# Patient Record
Sex: FEMALE | Race: WHITE | NOT HISPANIC OR LATINO | Employment: FULL TIME | URBAN - METROPOLITAN AREA
[De-identification: names, ages, dates, MRNs, and addresses within clinical notes are randomized per-mention and may not be internally consistent; named-entity substitution may affect disease eponyms.]

---

## 2019-02-13 ENCOUNTER — OFFICE VISIT (OUTPATIENT)
Dept: URGENT CARE | Facility: CLINIC | Age: 24
End: 2019-02-13
Payer: COMMERCIAL

## 2019-02-13 VITALS
HEART RATE: 80 BPM | WEIGHT: 270 LBS | RESPIRATION RATE: 18 BRPM | OXYGEN SATURATION: 100 % | HEIGHT: 66 IN | SYSTOLIC BLOOD PRESSURE: 115 MMHG | TEMPERATURE: 98 F | DIASTOLIC BLOOD PRESSURE: 66 MMHG | BODY MASS INDEX: 43.39 KG/M2

## 2019-02-13 DIAGNOSIS — J01.00 ACUTE NON-RECURRENT MAXILLARY SINUSITIS: Primary | ICD-10-CM

## 2019-02-13 DIAGNOSIS — B30.9 ACUTE VIRAL CONJUNCTIVITIS OF RIGHT EYE: ICD-10-CM

## 2019-02-13 PROCEDURE — G0382 LEV 3 HOSP TYPE B ED VISIT: HCPCS | Performed by: EMERGENCY MEDICINE

## 2019-02-13 PROCEDURE — 99283 EMERGENCY DEPT VISIT LOW MDM: CPT | Performed by: EMERGENCY MEDICINE

## 2019-02-13 RX ORDER — LISDEXAMFETAMINE DIMESYLATE 40 MG/1
CAPSULE ORAL
COMMUNITY
Start: 2019-01-08 | End: 2021-02-05

## 2019-02-13 RX ORDER — AMOXICILLIN AND CLAVULANATE POTASSIUM 875; 125 MG/1; MG/1
1 TABLET, FILM COATED ORAL EVERY 12 HOURS SCHEDULED
Qty: 20 TABLET | Refills: 0 | Status: SHIPPED | OUTPATIENT
Start: 2019-02-13 | End: 2019-02-23

## 2019-02-13 RX ORDER — POLYMYXIN B SULFATE AND TRIMETHOPRIM 1; 10000 MG/ML; [USP'U]/ML
1 SOLUTION OPHTHALMIC EVERY 6 HOURS
Qty: 10 ML | Refills: 0 | Status: SHIPPED | OUTPATIENT
Start: 2019-02-13 | End: 2019-02-20

## 2019-02-13 RX ORDER — PREDNISONE 10 MG/1
TABLET ORAL
Qty: 27 TABLET | Refills: 0 | Status: SHIPPED | OUTPATIENT
Start: 2019-02-13 | End: 2019-11-17

## 2019-02-13 NOTE — PROGRESS NOTES
[facility] [facility]        NAME: Coby Campa is a 26 y o  female  : 1995    MRN: [id number]  DATE: 2019  TIME: 4:41 PM    Assessment and Plan   Acute non-recurrent maxillary sinusitis [J01 00]  1  Acute non-recurrent maxillary sinusitis  amoxicillin-clavulanate (AUGMENTIN) 875-125 mg per tablet   2  Acute viral conjunctivitis of right eye  predniSONE 10 mg tablet    polymyxin b-trimethoprim (POLYTRIM) ophthalmic solution         Patient Instructions     Patient Instructions     Take an expectorant - guaifenesin should be the only ingredient - during the day, and the cough suppressant (ex  Robitussin DM or Tessalon) if needed at night only  Take Zinc 12 5 to 15 mg every 2 - 3 hrs while awake for the next few days  You may take Cold Steven (13 3 mg of Zinc) or split a 25 mg Zinc tablet or lozenge in two or a 50 mg into four to get the proper dose  The total daily dose of Zinc should exceed 75 mg per day  If you are diabetic, you should also adhere strictly to your diet and monitor your blood sugar closely while on the steroids as discussed  Sinusitis, Ambulatory Care   GENERAL INFORMATION:   Sinusitis  is inflammation or infection of your sinuses  It is most often caused by a virus  Acute sinusitis may last up to 12 weeks  Chronic sinusitis lasts longer than 12 weeks  Recurrent sinusitis is when you have 3 or more episodes of sinusitis in 1 year    Common symptoms include the following:   · Fever    · Pain, pressure, redness, or swelling around the forehead, cheeks, or eyes    · Thick yellow or green discharge from your nose    · Tenderness when you touch your face over your sinuses    · Dry cough that happens mostly at night or when you lie down    · Headache and face pain that is worse when you lean forward    · Teeth pain or pain when you chew  Seek immediate care for the following symptoms:   · Vision changes such as double vision    · Confusion or trouble thinking clearly    · Headache and stiff neck    · Trouble breathing  Treatment for sinusitis  may include medicines to relieve nasal and sinus congestion or to decrease pain and fever  Ask your healthcare provider which medicines you should take and how much is safe  Manage sinusitis:   · Drink liquids as directed  Ask your healthcare provider how much liquid to drink each day and which liquids are best for you  Liquids will help loosen and drain the mucus in your sinuses  · Breathe in steam   Heat a bowl of water until you see steam  Lean over the bowl and make a tent over your head with a large towel  Breathe deeply for about 20 minutes  Be careful not to get too close to the steam or burn yourself  Do this 3 times a day  You can also breathe deeply when you take a hot shower  · Rinse your sinuses  Use a sinus rinse device to rinse your nasal passages with a saline (salt water) solution  This will help thin the mucus in your nose and rinse away pollen and dirt  It will also help reduce swelling so you can breathe normally  Ask how often to do this  · Use heat on your sinuses  to decrease pain  Apply heat for 15 to 20 minutes every hour for as many days as directed  · Sleep with your head elevated  Place an extra pillow under your head before you go to sleep to help your sinuses drain  · Do not smoke and avoid secondhand smoke  If you smoke, it is never too late to quit  Ask for information about how to stop smoking if you need help  Prevent the spread of germs that cause sinusitis:  Wash your hands often with soap and water  Wash your hands after you use the bathroom, change a child's diaper, or sneeze  Wash your hands before you prepare or eat food  Follow up with your healthcare provider as directed:  Write down your questions so you remember to ask them during your visits  CARE AGREEMENT:   You have the right to help plan your care   Learn about your health condition and how it may be treated  Discuss treatment options with your caregivers to decide what care you want to receive  You always have the right to refuse treatment  The above information is an  only  It is not intended as medical advice for individual conditions or treatments  Talk to your doctor, nurse or pharmacist before following any medical regimen to see if it is safe and effective for you  © 2014 [address] is for End User's use only and may not be sold, redistributed or otherwise used for commercial purposes  All illustrations and images included in CareNotes® are the copyrighted property of [employer]  or [employer]  You have been diagnosed with conjunctivitis, which may be viral, bacterial, allergic or chemical and there is no test available in an urgent care setting can be used to tell the difference  Statistically 95% of the cases are viral   The viruses that cause conjunctivitis often times for the same viruses that cause viral upper respiratory infections  Although your conjunctivitis today is likely viral we do give antibiotic drops as a precaution  Avoid Visine or any similar vasoconstrictor drops as this will interfere with your body's ability to fight this infection  Use only the antibiotic drops as prescribed but you may also  use ice or cool compresses to t the he eyelids or an eye cup with cold saline to reduce redness or swelling  You may also take an anti-inflammatory like Advil leave for the redness and swelling  Take Zinc 12 5 to 15 mg every 2 - 3 hrs while awake for the next few days  You may take Cold Steven (13 3 mg of Zinc) or split a 25 mg Zinc tablet or lozenge in two or a 50 mg into four to get the proper dose  The total daily dose of Zinc should exceed 75 mg per day  Conjunctivitis   AMBULATORY CARE:   Conjunctivitis,  or pink eye, is inflammation of your conjunctiva   The conjunctiva is a thin tissue that covers the front of your eye and the back of your eyelids  The conjunctiva helps protect your eye and keep it moist  Conjunctivitis may be caused by bacteria, allergies, or a virus  If your conjunctivitis is caused by bacteria, it may get better on its own in about 7 days  Viral conjunctivitis can last up to 3 weeks  Common symptoms may include any of the following: You will usually have symptoms in both eyes if your conjunctivitis is caused by allergies  You may also have other allergic symptoms, such as a rash or runny nose  Symptoms will usually start in 1 eye if your conjunctivitis is caused by a virus or bacteria  · Redness in the whites of your eye    · Itching in your eye or around your eye    · Feeling like there is something in your eye    · Watery or thick, sticky discharge    · Crusty eyelids when you wake up in the morning    · Burning, stinging, or swelling in your eye    · Pain when you see bright light  Seek care immediately if:   · You have worsening eye pain  · The swelling in your eye gets worse, even after treatment  · Your vision suddenly becomes worse or you cannot see at all  Contact your healthcare provider if:   · You develop a fever and ear pain  · You have tiny bumps or spots of blood on your eye  · You have questions or concerns about your condition or care  Treatment  will depend on the cause of your conjunctivitis  You may need antibiotics or allergy medicine as a pill, eye drop, or eye ointment  Manage your symptoms:   · Apply a cool compress  Wet a washcloth with cold water and place it on your eye  This will help decrease itching and irritation  · Do not wear contact lenses  They can irritate your eye  Throw away the pair you are using and ask when you can wear them again  Use a new pair of lenses when your healthcare provider says it is okay  · Avoid irritants  Stay away from smoke filled areas  Shield your eyes from wind and sun  · Flush your eye    You may need to flush your eye with saline to help decrease your symptoms  Ask for more information on how to flush your eye  Medicines:  Treatment depends on what is causing your conjunctivitis  You may be given any of the following:  · Allergy medicine  helps decrease itchy, red, swollen eyes caused by allergies  It may be given as a pill, eye drops, or nasal spray  · Antibiotics  may be needed if your conjunctivitis is caused by bacteria  This medicine may be given as a pill, eye drops, or eye ointment  · Take your medicine as directed  Contact your healthcare provider if you think your medicine is not helping or if you have side effects  Tell him or her if you are allergic to any medicine  Keep a list of the medicines, vitamins, and herbs you take  Include the amounts, and when and why you take them  Bring the list or the pill bottles to follow-up visits  Carry your medicine list with you in case of an emergency  Prevent the spread of conjunctivitis:   · Wash your hands with soap and water often  Wash your hands before and after you touch your eyes  Also wash your hands before you prepare or eat food and after you use the bathroom or change a diaper  · Avoid allergens  Try to avoid the things that cause your allergies, such as pets, dust, or grass  · Avoid contact with others  Do not share towels or washcloths  Try to stay away from others as much as possible  Ask when you can return to work or school  · Throw away eye makeup  The bacteria that caused your conjunctivitis can stay in eye makeup  Throw away mascara and other eye makeup  © 2017 [address] Information is for End User's use only and may not be sold, redistributed or otherwise used for commercial purposes  All illustrations and images included in CareNotes® are the copyrighted property of [employer]  or Luis Medrano  The above information is an  only   It is not intended as medical advice for individual conditions or treatments  Talk to your doctor, nurse or pharmacist before following any medical regimen to see if it is safe and effective for you  Follow up with PCP in 3-5 days  Proceed to  ER if symptoms worsen  Chief Complaint     Chief Complaint   Patient presents with    Cold Like Symptoms     c/o sinus and chest congestion for 3 weeks increasing in severity  awoke this am with purulent drainage in right eye         History of Present Illness       Patient with cough and congestion for past 3 weeks now with maxillary sinus pain and pressure for the past few days associated with purulent nasal discharge  She also complains of left eye redness with a crusty discharge this morning  Review of Systems   Review of Systems   Constitutional: Negative for chills and fever  HENT: Positive for rhinorrhea, sinus pressure and sore throat  Negative for congestion, trouble swallowing and voice change  Eyes: Positive for discharge, redness and itching  Negative for photophobia, pain and visual disturbance  Respiratory: Negative for cough, chest tightness, shortness of breath and wheezing  Cardiovascular: Negative for chest pain           Current Medications       Current Outpatient Medications:     amoxicillin-clavulanate (AUGMENTIN) 875-125 mg per tablet, Take 1 tablet by mouth every 12 (twelve) hours for 10 days, Disp: 20 tablet, Rfl: 0    polymyxin b-trimethoprim (POLYTRIM) ophthalmic solution, Administer 1 drop to the right eye every 6 (six) hours for 7 days, Disp: 10 mL, Rfl: 0    predniSONE 10 mg tablet, Take once daily all days pills on this schedule 6- 6- 5- 4- 3- 2- 1, Disp: 27 tablet, Rfl: 0    VYVANSE 40 MG capsule, , Disp: , Rfl:     Current Allergies     Allergies as of 02/13/2019    (No Known Allergies)            The following portions of the patient's history were reviewed and updated as appropriate: allergies, current medications, past family history, past medical history, past social history, past surgical history and problem list      Past Medical History:   Diagnosis Date    PCOS (polycystic ovarian syndrome)        Past Surgical History:   Procedure Laterality Date    ADENOIDECTOMY      TONSILLECTOMY         Family History   Problem Relation Age of Onset    No Known Problems Mother     No Known Problems Father          Medications have been verified  Objective   /66   Pulse 80   Temp 98 °F (36 7 °C) (Tympanic)   Resp 18   Ht 5' 6" (1 676 m)   Wt 122 kg (270 lb)   LMP 12/24/2018   SpO2 100%   BMI 43 58 kg/m²        Physical Exam     Physical Exam   Constitutional: She is oriented to person, place, and time  She appears well-developed and well-nourished  No distress  HENT:   Head: Normocephalic and atraumatic  Right Ear: Tympanic membrane and external ear normal    Left Ear: Tympanic membrane and external ear normal    Nose: Mucosal edema present  Mouth/Throat: Posterior oropharyngeal erythema present  No oropharyngeal exudate or tonsillar abscesses  Tender over right maxillary sinus  Eyes: Pupils are equal, round, and reactive to light  EOM are normal  Right eye exhibits discharge  Left eye exhibits no discharge  Right conjunctiva is injected  No scleral icterus  Neck: Neck supple  Cardiovascular: Normal rate and regular rhythm  Pulmonary/Chest: Effort normal and breath sounds normal    Abdominal: Soft  Bowel sounds are normal    Neurological: She is alert and oriented to person, place, and time  Skin: Skin is warm and dry  Nursing note and vitals reviewed

## 2019-02-13 NOTE — PATIENT INSTRUCTIONS
Take an expectorant - guaifenesin should be the only ingredient - during the day, and the cough suppressant (ex  Robitussin DM or Tessalon) if needed at night only  Take Zinc 12 5 to 15 mg every 2 - 3 hrs while awake for the next few days  You may take Cold Steven (13 3 mg of Zinc) or split a 25 mg Zinc tablet or lozenge in two or a 50 mg into four to get the proper dose  The total daily dose of Zinc should exceed 75 mg per day  If you are diabetic, you should also adhere strictly to your diet and monitor your blood sugar closely while on the steroids as discussed  Sinusitis, Ambulatory Care   GENERAL INFORMATION:   Sinusitis  is inflammation or infection of your sinuses  It is most often caused by a virus  Acute sinusitis may last up to 12 weeks  Chronic sinusitis lasts longer than 12 weeks  Recurrent sinusitis is when you have 3 or more episodes of sinusitis in 1 year  Common symptoms include the following:   · Fever    · Pain, pressure, redness, or swelling around the forehead, cheeks, or eyes    · Thick yellow or green discharge from your nose    · Tenderness when you touch your face over your sinuses    · Dry cough that happens mostly at night or when you lie down    · Headache and face pain that is worse when you lean forward    · Teeth pain or pain when you chew  Seek immediate care for the following symptoms:   · Vision changes such as double vision    · Confusion or trouble thinking clearly    · Headache and stiff neck    · Trouble breathing  Treatment for sinusitis  may include medicines to relieve nasal and sinus congestion or to decrease pain and fever  Ask your healthcare provider which medicines you should take and how much is safe  Manage sinusitis:   · Drink liquids as directed  Ask your healthcare provider how much liquid to drink each day and which liquids are best for you  Liquids will help loosen and drain the mucus in your sinuses      · Breathe in steam   Heat a bowl of water until you see steam  Lean over the bowl and make a tent over your head with a large towel  Breathe deeply for about 20 minutes  Be careful not to get too close to the steam or burn yourself  Do this 3 times a day  You can also breathe deeply when you take a hot shower  · Rinse your sinuses  Use a sinus rinse device to rinse your nasal passages with a saline (salt water) solution  This will help thin the mucus in your nose and rinse away pollen and dirt  It will also help reduce swelling so you can breathe normally  Ask how often to do this  · Use heat on your sinuses  to decrease pain  Apply heat for 15 to 20 minutes every hour for as many days as directed  · Sleep with your head elevated  Place an extra pillow under your head before you go to sleep to help your sinuses drain  · Do not smoke and avoid secondhand smoke  If you smoke, it is never too late to quit  Ask for information about how to stop smoking if you need help  Prevent the spread of germs that cause sinusitis:  Wash your hands often with soap and water  Wash your hands after you use the bathroom, change a child's diaper, or sneeze  Wash your hands before you prepare or eat food  Follow up with your healthcare provider as directed:  Write down your questions so you remember to ask them during your visits  CARE AGREEMENT:   You have the right to help plan your care  Learn about your health condition and how it may be treated  Discuss treatment options with your caregivers to decide what care you want to receive  You always have the right to refuse treatment  The above information is an  only  It is not intended as medical advice for individual conditions or treatments  Talk to your doctor, nurse or pharmacist before following any medical regimen to see if it is safe and effective for you    © 2014 [address] is for End User's use only and may not be sold, redistributed or otherwise used for commercial purposes  All illustrations and images included in CareNotes® are the copyrighted property of A D [employer] M [employer]  or Luis Medrano  You have been diagnosed with conjunctivitis, which may be viral, bacterial, allergic or chemical and there is no test available in an urgent care setting can be used to tell the difference  Statistically 95% of the cases are viral   The viruses that cause conjunctivitis often times for the same viruses that cause viral upper respiratory infections  Although your conjunctivitis today is likely viral we do give antibiotic drops as a precaution  Avoid Visine or any similar vasoconstrictor drops as this will interfere with your body's ability to fight this infection  Use only the antibiotic drops as prescribed but you may also  use ice or cool compresses to t the he eyelids or an eye cup with cold saline to reduce redness or swelling  You may also take an anti-inflammatory like Advil leave for the redness and swelling  Take Zinc 12 5 to 15 mg every 2 - 3 hrs while awake for the next few days  You may take Cold Steven (13 3 mg of Zinc) or split a 25 mg Zinc tablet or lozenge in two or a 50 mg into four to get the proper dose  The total daily dose of Zinc should exceed 75 mg per day  Conjunctivitis   AMBULATORY CARE:   Conjunctivitis,  or pink eye, is inflammation of your conjunctiva  The conjunctiva is a thin tissue that covers the front of your eye and the back of your eyelids  The conjunctiva helps protect your eye and keep it moist  Conjunctivitis may be caused by bacteria, allergies, or a virus  If your conjunctivitis is caused by bacteria, it may get better on its own in about 7 days  Viral conjunctivitis can last up to 3 weeks  Common symptoms may include any of the following: You will usually have symptoms in both eyes if your conjunctivitis is caused by allergies  You may also have other allergic symptoms, such as a rash or runny nose   Symptoms will usually start in 1 eye if your conjunctivitis is caused by a virus or bacteria  · Redness in the whites of your eye    · Itching in your eye or around your eye    · Feeling like there is something in your eye    · Watery or thick, sticky discharge    · Crusty eyelids when you wake up in the morning    · Burning, stinging, or swelling in your eye    · Pain when you see bright light  Seek care immediately if:   · You have worsening eye pain  · The swelling in your eye gets worse, even after treatment  · Your vision suddenly becomes worse or you cannot see at all  Contact your healthcare provider if:   · You develop a fever and ear pain  · You have tiny bumps or spots of blood on your eye  · You have questions or concerns about your condition or care  Treatment  will depend on the cause of your conjunctivitis  You may need antibiotics or allergy medicine as a pill, eye drop, or eye ointment  Manage your symptoms:   · Apply a cool compress  Wet a washcloth with cold water and place it on your eye  This will help decrease itching and irritation  · Do not wear contact lenses  They can irritate your eye  Throw away the pair you are using and ask when you can wear them again  Use a new pair of lenses when your healthcare provider says it is okay  · Avoid irritants  Stay away from smoke filled areas  Shield your eyes from wind and sun  · Flush your eye  You may need to flush your eye with saline to help decrease your symptoms  Ask for more information on how to flush your eye  Medicines:  Treatment depends on what is causing your conjunctivitis  You may be given any of the following:  · Allergy medicine  helps decrease itchy, red, swollen eyes caused by allergies  It may be given as a pill, eye drops, or nasal spray  · Antibiotics  may be needed if your conjunctivitis is caused by bacteria  This medicine may be given as a pill, eye drops, or eye ointment      · Take your medicine as directed  Contact your healthcare provider if you think your medicine is not helping or if you have side effects  Tell him or her if you are allergic to any medicine  Keep a list of the medicines, vitamins, and herbs you take  Include the amounts, and when and why you take them  Bring the list or the pill bottles to follow-up visits  Carry your medicine list with you in case of an emergency  Prevent the spread of conjunctivitis:   · Wash your hands with soap and water often  Wash your hands before and after you touch your eyes  Also wash your hands before you prepare or eat food and after you use the bathroom or change a diaper  · Avoid allergens  Try to avoid the things that cause your allergies, such as pets, dust, or grass  · Avoid contact with others  Do not share towels or washcloths  Try to stay away from others as much as possible  Ask when you can return to work or school  · Throw away eye makeup  The bacteria that caused your conjunctivitis can stay in eye makeup  Throw away mascara and other eye makeup  © 2017 [address] [facility] Information is for End User's use only and may not be sold, redistributed or otherwise used for commercial purposes  All illustrations and images included in CareNotes® are the copyrighted property of [employer] M [employer]  or Luis Medrano  The above information is an  only  It is not intended as medical advice for individual conditions or treatments  Talk to your doctor, nurse or pharmacist before following any medical regimen to see if it is safe and effective for you

## 2019-02-20 ENCOUNTER — OFFICE VISIT (OUTPATIENT)
Dept: URGENT CARE | Facility: CLINIC | Age: 24
End: 2019-02-20
Payer: COMMERCIAL

## 2019-02-20 VITALS
HEIGHT: 66 IN | DIASTOLIC BLOOD PRESSURE: 64 MMHG | HEART RATE: 78 BPM | WEIGHT: 271 LBS | TEMPERATURE: 99.2 F | RESPIRATION RATE: 18 BRPM | OXYGEN SATURATION: 99 % | BODY MASS INDEX: 43.55 KG/M2 | SYSTOLIC BLOOD PRESSURE: 118 MMHG

## 2019-02-20 DIAGNOSIS — B37.3 YEAST VAGINITIS: Primary | ICD-10-CM

## 2019-02-20 LAB
SL AMB  POCT GLUCOSE, UA: NEGATIVE
SL AMB LEUKOCYTE ESTERASE,UA: NEGATIVE
SL AMB POCT BILIRUBIN,UA: NEGATIVE
SL AMB POCT BLOOD,UA: NEGATIVE
SL AMB POCT CLARITY,UA: CLEAR
SL AMB POCT COLOR,UA: NORMAL
SL AMB POCT KETONES,UA: NEGATIVE
SL AMB POCT NITRITE,UA: NEGATIVE
SL AMB POCT PH,UA: 5
SL AMB POCT SPECIFIC GRAVITY,UA: 1.02
SL AMB POCT URINE HCG: NEGATIVE
SL AMB POCT URINE PROTEIN: NEGATIVE
SL AMB POCT UROBILINOGEN: NORMAL

## 2019-02-20 PROCEDURE — 87591 N.GONORRHOEAE DNA AMP PROB: CPT | Performed by: PHYSICIAN ASSISTANT

## 2019-02-20 PROCEDURE — 87491 CHLMYD TRACH DNA AMP PROBE: CPT | Performed by: PHYSICIAN ASSISTANT

## 2019-02-20 RX ORDER — FLUCONAZOLE 150 MG/1
150 TABLET ORAL ONCE
Qty: 1 TABLET | Refills: 0 | Status: SHIPPED | OUTPATIENT
Start: 2019-02-20 | End: 2019-02-22 | Stop reason: SDUPTHER

## 2019-02-20 NOTE — PROGRESS NOTES
[facility]        NAME: Coby Campa is a 26 y o  female  : 1995    MRN: [id number]  DATE: 2019  TIME: 11:18 AM    Assessment and Plan   Yeast vaginitis [B37 3]  1  Yeast vaginitis  fluconazole (DIFLUCAN) 150 mg tablet    Chlamydia/GC amplified DNA by PCR    POCT urine dip    Chlamydia/GC amplified DNA by PCR    POCT urine HCG     Patient Instructions     Take medicine as prescribed when finished with antibiotic  Add flonase nasal spray for congestion  I will call you with the results of your GC test  Proceed to  ER if symptoms worsen  Chief Complaint     Chief Complaint   Patient presents with    Vaginitis     "I think I have a yeast infection" Vaginal reddness/irritation  Denies discharge     History of Present Illness     22yo F p/w vaginal redness and irritation x 3 days  Patient states she has recently been exposed to chlamydia and has not been tested since exposure  Patient also is on day 7 of 10 of augmentin treatment for sinusitis  Patient denies vaginal discharge, vaginal bleeding, fever, abdominal pain, n/v/d  Review of Systems   Review of Systems   Constitutional: Negative for activity change, appetite change, chills, diaphoresis, fatigue, fever and unexpected weight change  Respiratory: Negative for apnea, cough, choking, chest tightness, shortness of breath, wheezing and stridor  Cardiovascular: Negative for chest pain, palpitations and leg swelling  Gastrointestinal: Negative for abdominal distention, abdominal pain, anal bleeding, blood in stool, constipation, diarrhea, nausea, rectal pain and vomiting  Genitourinary: Positive for dysuria and vaginal pain (discomfort)  Negative for decreased urine volume, difficulty urinating, dyspareunia, enuresis, flank pain, frequency, genital sores, hematuria, menstrual problem, pelvic pain, urgency, vaginal bleeding and vaginal discharge     Musculoskeletal: Negative for arthralgias, back pain, gait problem, joint swelling, myalgias, neck pain and neck stiffness  Skin: Negative for color change, pallor, rash and wound  Current Medications       Current Outpatient Medications:     amoxicillin-clavulanate (AUGMENTIN) 875-125 mg per tablet, Take 1 tablet by mouth every 12 (twelve) hours for 10 days, Disp: 20 tablet, Rfl: 0    polymyxin b-trimethoprim (POLYTRIM) ophthalmic solution, Administer 1 drop to the right eye every 6 (six) hours for 7 days, Disp: 10 mL, Rfl: 0    predniSONE 10 mg tablet, Take once daily all days pills on this schedule 6- 6- 5- 4- 3- 2- 1, Disp: 27 tablet, Rfl: 0    VYVANSE 40 MG capsule, , Disp: , Rfl:     fluconazole (DIFLUCAN) 150 mg tablet, Take 1 tablet (150 mg total) by mouth once for 1 dose, Disp: 1 tablet, Rfl: 0    Current Allergies     Allergies as of 02/20/2019    (No Known Allergies)            The following portions of the patient's history were reviewed and updated as appropriate: allergies, current medications, past family history, past medical history, past social history, past surgical history and problem list      Past Medical History:   Diagnosis Date    PCOS (polycystic ovarian syndrome)        Past Surgical History:   Procedure Laterality Date    ADENOIDECTOMY      TONSILLECTOMY         Family History   Problem Relation Age of Onset    No Known Problems Mother     No Known Problems Father          Medications have been verified  Objective   /64   Pulse 78   Temp 99 2 °F (37 3 °C) (Tympanic)   Resp 18   Ht 5' 6" (1 676 m)   Wt 123 kg (271 lb)   LMP 12/24/2018   SpO2 99%   Breastfeeding? No   BMI 43 74 kg/m²        Physical Exam     Physical Exam   Constitutional: She appears well-developed and well-nourished  HENT:   Head: Normocephalic  Right Ear: External ear normal    Left Ear: External ear normal    Nose: Mucosal edema and rhinorrhea present  Right sinus exhibits frontal sinus tenderness   Left sinus exhibits frontal sinus tenderness  Mouth/Throat: Oropharynx is clear and moist  No oropharyngeal exudate  Cardiovascular: Normal rate, regular rhythm, normal heart sounds and intact distal pulses  Exam reveals no gallop and no friction rub  No murmur heard  Pulmonary/Chest: Effort normal and breath sounds normal  No stridor  No respiratory distress  She has no wheezes  She has no rales     Genitourinary:   Genitourinary Comments: Patient deferred vaginal exam

## 2019-02-21 LAB
C TRACH DNA SPEC QL NAA+PROBE: NEGATIVE
N GONORRHOEA DNA SPEC QL NAA+PROBE: NEGATIVE

## 2019-02-22 RX ORDER — FLUCONAZOLE 150 MG/1
150 TABLET ORAL ONCE
Qty: 1 TABLET | Refills: 0 | Status: SHIPPED | OUTPATIENT
Start: 2019-02-22 | End: 2019-02-22

## 2019-10-25 ENCOUNTER — OFFICE VISIT (OUTPATIENT)
Dept: URGENT CARE | Facility: CLINIC | Age: 24
End: 2019-10-25
Payer: COMMERCIAL

## 2019-10-25 VITALS
HEART RATE: 81 BPM | SYSTOLIC BLOOD PRESSURE: 132 MMHG | WEIGHT: 255 LBS | HEIGHT: 66 IN | OXYGEN SATURATION: 99 % | TEMPERATURE: 97 F | DIASTOLIC BLOOD PRESSURE: 80 MMHG | BODY MASS INDEX: 40.98 KG/M2 | RESPIRATION RATE: 18 BRPM

## 2019-10-25 DIAGNOSIS — J02.9 SORE THROAT: Primary | ICD-10-CM

## 2019-10-25 DIAGNOSIS — J20.8 ACUTE BACTERIAL BRONCHITIS: ICD-10-CM

## 2019-10-25 DIAGNOSIS — H65.03 BILATERAL ACUTE SEROUS OTITIS MEDIA, RECURRENCE NOT SPECIFIED: ICD-10-CM

## 2019-10-25 DIAGNOSIS — B96.89 ACUTE BACTERIAL BRONCHITIS: ICD-10-CM

## 2019-10-25 LAB — S PYO AG THROAT QL: NEGATIVE

## 2019-10-25 PROCEDURE — 87880 STREP A ASSAY W/OPTIC: CPT | Performed by: EMERGENCY MEDICINE

## 2019-10-25 PROCEDURE — 99213 OFFICE O/P EST LOW 20 MIN: CPT | Performed by: EMERGENCY MEDICINE

## 2019-10-25 RX ORDER — PREDNISONE 10 MG/1
TABLET ORAL
Qty: 27 TABLET | Refills: 0 | Status: SHIPPED | OUTPATIENT
Start: 2019-10-25 | End: 2021-02-05

## 2019-10-25 RX ORDER — FLUCONAZOLE 150 MG/1
150 TABLET ORAL ONCE
Qty: 1 TABLET | Refills: 0 | Status: SHIPPED | OUTPATIENT
Start: 2019-10-25 | End: 2019-10-25

## 2019-10-25 RX ORDER — AZITHROMYCIN 250 MG/1
TABLET, FILM COATED ORAL
Qty: 6 TABLET | Refills: 0 | Status: SHIPPED | OUTPATIENT
Start: 2019-10-25 | End: 2019-10-29

## 2019-10-25 RX ORDER — VALACYCLOVIR HYDROCHLORIDE 1 G/1
500 TABLET, FILM COATED ORAL DAILY
COMMUNITY
End: 2021-02-05

## 2019-10-25 NOTE — PATIENT INSTRUCTIONS
Take an expectorant - guaifenesin should be the only ingredient - during the day, and the cough suppressant (ex  Robitussin DM or Tessalon) if needed at night only  Take Zinc 12 5 to 15 mg every 2 - 3 hrs while awake for the next few days  You may take Cold Steven (13 3 mg of Zinc) or split a 25 mg Zinc tablet or lozenge in two or a 50 mg into four to get the proper dose  The total daily dose of Zinc should exceed 75 mg per day  You may also take a decongestant like Sudafed, unless you have hypertension or cardiac disease  Hold any NSAIDs like Ibuprofen (Advil), Naprosyn (Aleve), etc while on steroids like Medrol or Prednisone  If you are diabetic, you should also adhere strictly to your diet and monitor your blood sugar closely while on the steroids as discussed  Acute Bronchitis   WHAT YOU NEED TO KNOW:   Acute bronchitis is swelling and irritation in the air passages of your lungs  This irritation may cause you to cough or have other breathing problems  Acute bronchitis often starts because of another illness, such as a cold or the flu  The illness spreads from your nose and throat to your windpipe and airways  Bronchitis is often called a chest cold  Acute bronchitis lasts about 3 to 6 weeks and is usually not a serious illness  Your cough can last for several weeks  DISCHARGE INSTRUCTIONS:   Return to the emergency department if:   · You cough up blood  · Your lips or fingernails turn blue  · You feel like you are not getting enough air when you breathe  Contact your healthcare provider if:   · You have a fever  · Your breathing problems do not go away or get worse  · Your cough does not get better within 4 weeks  · You have questions or concerns about your condition or care  Self-care:   · Get more rest   Rest helps your body to heal  Slowly start to do more each day  Rest when you feel it is needed  · Avoid irritants in the air    Avoid chemicals, fumes, and dust  Wear a face mask if you must work around dust or fumes  Stay inside on days when air pollution levels are high  If you have allergies, stay inside when pollen counts are high  Do not use aerosol products, such as spray-on deodorant, bug spray, and hair spray  · Do not smoke or be around others who smoke  Nicotine and other chemicals in cigarettes and cigars damages the cilia that move mucus out of your lungs  Ask your healthcare provider for information if you currently smoke and need help to quit  E-cigarettes or smokeless tobacco still contain nicotine  Talk to your healthcare provider before you use these products  · Drink liquids as directed  Liquids help keep your air passages moist and help you cough up mucus  You may need to drink more liquids when you have acute bronchitis  Ask how much liquid to drink each day and which liquids are best for you  · Use a humidifier or vaporizer  Use a cool mist humidifier or a vaporizer to increase air moisture in your home  This may make it easier for you to breathe and help decrease your cough  Decrease risk for acute bronchitis:   · Get the vaccinations you need  Ask your healthcare provider if you should get vaccinated against the flu or pneumonia  · Prevent the spread of germs  You can decrease your risk of acute bronchitis and other illnesses by doing the following:     [facility] [place] [place] [employer] your hands often with soap and water  Carry germ-killing hand lotion or gel with you  You can use the lotion or gel to clean your hands when soap and water are not available  ¨ Do not touch your eyes, nose, or mouth unless you have washed your hands first     ¨ Always cover your mouth when you cough to prevent the spread of germs  It is best to cough into a tissue or your shirt sleeve instead of into your hand  Ask those around you cover their mouths when they cough  ¨ Try to avoid people who have a cold or the flu   If you are sick, stay away from others as much as possible  Medicines: Your healthcare provider may  give you any of the following:  · Ibuprofen or acetaminophen  are medicines that help lower your fever  They are available without a doctor's order  Ask your healthcare provider which medicine is right for you  Ask how much to take and how often to take it  Follow directions  These medicines can cause stomach bleeding if not taken correctly  Ibuprofen can cause kidney damage  Do not take ibuprofen if you have kidney disease, an ulcer, or allergies to aspirin  Acetaminophen can cause liver damage  Do not take more than 4,000 milligrams in 24 hours  · Decongestants  help loosen mucus in your lungs and make it easier to cough up  This can help you breathe easier  · Cough suppressants  decrease your urge to cough  If your cough produces mucus, do not take a cough suppressant unless your healthcare provider tells you to  Your healthcare provider may suggest that you take a cough suppressant at night so you can rest     · Inhalers  may be given  Your healthcare provider may give you one or more inhalers to help you breathe easier and cough less  An inhaler gives your medicine to open your airways  Ask your healthcare provider to show you how to use your inhaler correctly  · Take your medicine as directed  Contact your healthcare provider if you think your medicine is not helping or if you have side effects  Tell him of her if you are allergic to any medicine  Keep a list of the medicines, vitamins, and herbs you take  Include the amounts, and when and why you take them  Bring the list or the pill bottles to follow-up visits  Carry your medicine list with you in case of an emergency  Follow up with your healthcare provider as directed:  Write down questions you have so you will remember to ask them during your follow-up visits    © 2017 [employer] Information is for End User's use only and may not be sold, redistributed or otherwise used for commercial purposes  All illustrations and images included in CareNotes® are the copyrighted property of [employer]  or [employer]  The above information is an  only  It is not intended as medical advice for individual conditions or treatments  Talk to your doctor, nurse or pharmacist before following any medical regimen to see if it is safe and effective for you  Serous Otitis Media   WHAT YOU NEED TO KNOW:   Serous otitis media is fluid trapped behind your tympanic membrane (eardrum), without an ear infection  Your eardrum is in your middle ear  Serous otitis media is also called otitis media with effusion  You may have fluid in your ear for months, but it usually goes away on its own  The fluid may be in one or both ears  The fluid may cause muffled sounds, and you may feel like your ears are full  Serous otitis media may be caused by an upper respiratory infection or allergies  It is most common in the fall and early spring  DISCHARGE INSTRUCTIONS:   Return to the emergency department if:   · You have a fever  · You have a sudden loss of hearing in your affected ear  · You develop a severe headache and stiff neck  · You have a seizure  Contact your healthcare provider if:   · You have fluid draining from your ear  · You have new symptoms  · You have questions or concerns about your condition or care  Follow up with your healthcare provider as directed: Your ears will need to be checked regularly  You may need to see a specialist  Write down your questions so you remember to ask them during your visits  © 2017 [employer] Information is for End User's use only and may not be sold, redistributed or otherwise used for commercial purposes  All illustrations and images included in CareNotes® are the copyrighted property of [employer] Inc  or Luis Medrano  The above information is an  only   It is not intended as medical advice for individual conditions or treatments  Talk to your doctor, nurse or pharmacist before following any medical regimen to see if it is safe and effective for you

## 2019-10-25 NOTE — PROGRESS NOTES
[facility]        NAME: Walda Rinne is a 27 y o  female  : 1995    MRN: [id number]  DATE: 2019  TIME: 8:24 AM    Assessment and Plan   Sore throat [J02 9]  1  Sore throat  POCT rapid strepA    predniSONE 10 mg tablet   2  Acute bacterial bronchitis  predniSONE 10 mg tablet    azithromycin (ZITHROMAX) 250 mg tablet    fluconazole (DIFLUCAN) 150 mg tablet   3  Bilateral acute serous otitis media, recurrence not specified  predniSONE 10 mg tablet         Patient Instructions     Patient Instructions       Take an expectorant - guaifenesin should be the only ingredient - during the day, and the cough suppressant (ex  Robitussin DM or Tessalon) if needed at night only  Take Zinc 12 5 to 15 mg every 2 - 3 hrs while awake for the next few days  You may take Cold Steven (13 3 mg of Zinc) or split a 25 mg Zinc tablet or lozenge in two or a 50 mg into four to get the proper dose  The total daily dose of Zinc should exceed 75 mg per day  You may also take a decongestant like Sudafed, unless you have hypertension or cardiac disease  Hold any NSAIDs like Ibuprofen (Advil), Naprosyn (Aleve), etc while on steroids like Medrol or Prednisone  If you are diabetic, you should also adhere strictly to your diet and monitor your blood sugar closely while on the steroids as discussed  Acute Bronchitis   WHAT YOU NEED TO KNOW:   Acute bronchitis is swelling and irritation in the air passages of your lungs  This irritation may cause you to cough or have other breathing problems  Acute bronchitis often starts because of another illness, such as a cold or the flu  The illness spreads from your nose and throat to your windpipe and airways  Bronchitis is often called a chest cold  Acute bronchitis lasts about 3 to 6 weeks and is usually not a serious illness  Your cough can last for several weeks  DISCHARGE INSTRUCTIONS:   Return to the emergency department if:   · You cough up blood      · Your lips or fingernails turn blue  · You feel like you are not getting enough air when you breathe  Contact your healthcare provider if:   · You have a fever  · Your breathing problems do not go away or get worse  · Your cough does not get better within 4 weeks  · You have questions or concerns about your condition or care  Self-care:   · Get more rest   Rest helps your body to heal  Slowly start to do more each day  Rest when you feel it is needed  · Avoid irritants in the air  Avoid chemicals, fumes, and dust  Wear a face mask if you must work around dust or fumes  Stay inside on days when air pollution levels are high  If you have allergies, stay inside when pollen counts are high  Do not use aerosol products, such as spray-on deodorant, bug spray, and hair spray  · Do not smoke or be around others who smoke  Nicotine and other chemicals in cigarettes and cigars damages the cilia that move mucus out of your lungs  Ask your healthcare provider for information if you currently smoke and need help to quit  E-cigarettes or smokeless tobacco still contain nicotine  Talk to your healthcare provider before you use these products  · Drink liquids as directed  Liquids help keep your air passages moist and help you cough up mucus  You may need to drink more liquids when you have acute bronchitis  Ask how much liquid to drink each day and which liquids are best for you  · Use a humidifier or vaporizer  Use a cool mist humidifier or a vaporizer to increase air moisture in your home  This may make it easier for you to breathe and help decrease your cough  Decrease risk for acute bronchitis:   · Get the vaccinations you need  Ask your healthcare provider if you should get vaccinated against the flu or pneumonia  · Prevent the spread of germs  You can decrease your risk of acute bronchitis and other illnesses by doing the following:     [facility] [place] [facility] [place] your hands often with soap and water   Carry germ-killing hand lotion or gel with you  You can use the lotion or gel to clean your hands when soap and water are not available  ¨ Do not touch your eyes, nose, or mouth unless you have washed your hands first     ¨ Always cover your mouth when you cough to prevent the spread of germs  It is best to cough into a tissue or your shirt sleeve instead of into your hand  Ask those around you cover their mouths when they cough  ¨ Try to avoid people who have a cold or the flu  If you are sick, stay away from others as much as possible  Medicines: Your healthcare provider may  give you any of the following:  · Ibuprofen or acetaminophen  are medicines that help lower your fever  They are available without a doctor's order  Ask your healthcare provider which medicine is right for you  Ask how much to take and how often to take it  Follow directions  These medicines can cause stomach bleeding if not taken correctly  Ibuprofen can cause kidney damage  Do not take ibuprofen if you have kidney disease, an ulcer, or allergies to aspirin  Acetaminophen can cause liver damage  Do not take more than 4,000 milligrams in 24 hours  · Decongestants  help loosen mucus in your lungs and make it easier to cough up  This can help you breathe easier  · Cough suppressants  decrease your urge to cough  If your cough produces mucus, do not take a cough suppressant unless your healthcare provider tells you to  Your healthcare provider may suggest that you take a cough suppressant at night so you can rest     · Inhalers  may be given  Your healthcare provider may give you one or more inhalers to help you breathe easier and cough less  An inhaler gives your medicine to open your airways  Ask your healthcare provider to show you how to use your inhaler correctly  · Take your medicine as directed  Contact your healthcare provider if you think your medicine is not helping or if you have side effects   Tell him of her if you are allergic to any medicine  Keep a list of the medicines, vitamins, and herbs you take  Include the amounts, and when and why you take them  Bring the list or the pill bottles to follow-up visits  Carry your medicine list with you in case of an emergency  Follow up with your healthcare provider as directed:  Write down questions you have so you will remember to ask them during your follow-up visits  © 2017 [employer] Information is for End User's use only and may not be sold, redistributed or otherwise used for commercial purposes  All illustrations and images included in CareNotes® are the copyrighted property of [employer]  or Luis Medrano  The above information is an  only  It is not intended as medical advice for individual conditions or treatments  Talk to your doctor, nurse or pharmacist before following any medical regimen to see if it is safe and effective for you  Serous Otitis Media   WHAT YOU NEED TO KNOW:   Serous otitis media is fluid trapped behind your tympanic membrane (eardrum), without an ear infection  Your eardrum is in your middle ear  Serous otitis media is also called otitis media with effusion  You may have fluid in your ear for months, but it usually goes away on its own  The fluid may be in one or both ears  The fluid may cause muffled sounds, and you may feel like your ears are full  Serous otitis media may be caused by an upper respiratory infection or allergies  It is most common in the fall and early spring  DISCHARGE INSTRUCTIONS:   Return to the emergency department if:   · You have a fever  · You have a sudden loss of hearing in your affected ear  · You develop a severe headache and stiff neck  · You have a seizure  Contact your healthcare provider if:   · You have fluid draining from your ear  · You have new symptoms  · You have questions or concerns about your condition or care    Follow up with your healthcare provider as directed: Your ears will need to be checked regularly  You may need to see a specialist  Write down your questions so you remember to ask them during your visits  © 2017 [facility] Information is for End User's use only and may not be sold, redistributed or otherwise used for commercial purposes  All illustrations and images included in CareNotes® are the copyrighted property of [employer]  or Luis Medrano  The above information is an  only  It is not intended as medical advice for individual conditions or treatments  Talk to your doctor, nurse or pharmacist before following any medical regimen to see if it is safe and effective for you  Follow up with PCP in 3-5 days  Proceed to  ER if symptoms worsen  Chief Complaint     Chief Complaint   Patient presents with    Cold Like Symptoms     nasal congestion, ear pain, dry eyes, chest congestion w/ mucus, sore throat, symptoms for 2 weeks         History of Present Illness       Patient complains of sore throat, cough congestion for past 2 weeks  She also complains of bilateral ear fullness for past few days  Review of Systems   Review of Systems   Constitutional: Negative for chills and fever  HENT: Positive for congestion, rhinorrhea, sinus pressure and sore throat  Negative for trouble swallowing and voice change  Respiratory: Positive for cough  Negative for chest tightness, shortness of breath and wheezing  Cardiovascular: Negative for chest pain           Current Medications       Current Outpatient Medications:     valACYclovir (VALTREX) 1,000 mg tablet, Take 500 mg by mouth 2 (two) times a day, Disp: , Rfl:     VYVANSE 40 MG capsule, , Disp: , Rfl:     predniSONE 10 mg tablet, Take once daily all days pills on this schedule 6- 6- 5- 4- 3- 2- 1 (Patient not taking: Reported on 10/25/2019), Disp: 27 tablet, Rfl: 0    predniSONE 10 mg tablet, Take once daily all days pills on this schedule 6- 6- 5- 4- 3- 2- 1, Disp: 27 tablet, Rfl: 0    Current Allergies     Allergies as of 10/25/2019    (No Known Allergies)            The following portions of the patient's history were reviewed and updated as appropriate: allergies, current medications, past family history, past medical history, past social history, past surgical history and problem list      Past Medical History:   Diagnosis Date    PCOS (polycystic ovarian syndrome)        Past Surgical History:   Procedure Laterality Date    ADENOIDECTOMY      TONSILLECTOMY         Family History   Problem Relation Age of Onset    No Known Problems Mother     No Known Problems Father          Medications have been verified  Objective   /80   Pulse 81   Temp (!) 97 °F (36 1 °C)   Resp 18   Ht 5' 6" (1 676 m)   Wt 116 kg (255 lb)   SpO2 99%   BMI 41 16 kg/m²        Physical Exam     Physical Exam   Constitutional: She is oriented to person, place, and time  She appears well-developed and well-nourished  No distress  HENT:   Head: Normocephalic and atraumatic  Right Ear: Tympanic membrane and external ear normal    Left Ear: Tympanic membrane and external ear normal    Nose: Mucosal edema present  Mouth/Throat: Posterior oropharyngeal erythema present  No oropharyngeal exudate or tonsillar abscesses  Nasal mucosa congested, oropharynx mildly erythematous  Clear effusion behind both TMs, no erythema of TMs  Neck: Neck supple  Cardiovascular: Normal rate and regular rhythm  Pulmonary/Chest: Effort normal  No respiratory distress  She has no wheezes  She has no rales  Neurological: She is alert and oriented to person, place, and time  Skin: Skin is warm and dry  Psychiatric: She has a normal mood and affect  Her behavior is normal  Judgment and thought content normal    Nursing note and vitals reviewed

## 2019-11-17 ENCOUNTER — OFFICE VISIT (OUTPATIENT)
Dept: URGENT CARE | Facility: CLINIC | Age: 24
End: 2019-11-17
Payer: COMMERCIAL

## 2019-11-17 VITALS
HEIGHT: 66 IN | SYSTOLIC BLOOD PRESSURE: 133 MMHG | HEART RATE: 85 BPM | WEIGHT: 251.32 LBS | BODY MASS INDEX: 40.39 KG/M2 | TEMPERATURE: 97.9 F | OXYGEN SATURATION: 97 % | DIASTOLIC BLOOD PRESSURE: 79 MMHG

## 2019-11-17 DIAGNOSIS — J20.9 ACUTE BRONCHITIS, UNSPECIFIED ORGANISM: Primary | ICD-10-CM

## 2019-11-17 DIAGNOSIS — J06.9 ACUTE RESPIRATORY DISEASE: ICD-10-CM

## 2019-11-17 PROCEDURE — G0382 LEV 3 HOSP TYPE B ED VISIT: HCPCS | Performed by: PHYSICIAN ASSISTANT

## 2019-11-17 RX ORDER — ALBUTEROL SULFATE 90 UG/1
2 AEROSOL, METERED RESPIRATORY (INHALATION) EVERY 6 HOURS PRN
Qty: 18 G | Refills: 0 | Status: SHIPPED | OUTPATIENT
Start: 2019-11-17 | End: 2021-02-05

## 2019-11-17 RX ORDER — VALACYCLOVIR HYDROCHLORIDE 500 MG/1
500 TABLET, FILM COATED ORAL
COMMUNITY
Start: 2019-10-31 | End: 2021-02-05

## 2019-11-17 RX ORDER — BROMPHENIRAMINE MALEATE, PSEUDOEPHEDRINE HYDROCHLORIDE, AND DEXTROMETHORPHAN HYDROBROMIDE 2; 30; 10 MG/5ML; MG/5ML; MG/5ML
10 SYRUP ORAL 4 TIMES DAILY PRN
Qty: 120 ML | Refills: 0 | Status: SHIPPED | OUTPATIENT
Start: 2019-11-17 | End: 2021-02-05

## 2019-11-17 NOTE — PROGRESS NOTES
[facility]        NAME: Sarita Funes is a 27 y o  female  : 1995    MRN: [id number]  DATE: 2019  TIME: 11:02 AM    Assessment and Plan   Acute bronchitis, unspecified organism [J20 9]  1  Acute bronchitis, unspecified organism  brompheniramine-pseudoephedrine-DM 30-2-10 MG/5ML syrup    albuterol (VENTOLIN HFA) 90 mcg/act inhaler   2  Acute respiratory disease           Patient Instructions     Take Bromfed DM as prescribed  Albuterol every 4-6 hours  Mucinex during the day  Some studies suggest that Zinc 12 5-15mg every 2 hours while awake x 5 days may shorten the duration cold symptoms by 1-2 days  Fluids and rest  Nasal saline spray; Afrin if severe congestion (do not use for more than 3 days)  Tylenol/Ibuprofen for pain/fever  Warm compresses over sinuses  Steam treatment (utilize proper safety precautions when in contact with hot water/steam)  Follow up with PCP in 3-5 days  Proceed to  ER if symptoms worsen  Chief Complaint     Chief Complaint   Patient presents with    Nasal Congestion     3 DAYS    Cough         History of Present Illness       PMH asthma  Denies hospitalization in the past year, recent oral corticosteroid use, DM, prior stroke/serious neurologic condition or CHF  Denies surgery in past 4 weeks, immobilization over 3 days, calf pain/swelling, hemoptysis, prior DVT/PE, clotting disorder, or current CA diagnosis/tx  Cough   This is a new problem  The current episode started in the past 7 days  The problem has been unchanged  The cough is productive of sputum  Associated symptoms include nasal congestion, rhinorrhea, shortness of breath and wheezing  Pertinent negatives include no chills, ear pain, fever, headaches, myalgias, postnasal drip, rash or sore throat  Treatments tried: mucinex, prednisone, azithromycin  Her past medical history is significant for asthma         Review of Systems   Review of Systems   Constitutional: Negative for activity change, appetite change, chills, fatigue and fever  HENT: Positive for congestion and rhinorrhea  Negative for ear discharge, ear pain, postnasal drip, sinus pressure, sinus pain, sneezing, sore throat and trouble swallowing  Respiratory: Positive for cough (PRODUCTIVE), shortness of breath and wheezing  Negative for chest tightness  Gastrointestinal: Negative for abdominal pain, diarrhea, nausea and vomiting  Musculoskeletal: Negative for myalgias  Skin: Negative for rash  Neurological: Negative for light-headedness and headaches           Current Medications       Current Outpatient Medications:     lisdexamfetamine (VYVANSE) 50 MG capsule, Take 1 capsule by mouth, Disp: , Rfl:     valACYclovir (VALTREX) 1,000 mg tablet, Take 500 mg by mouth 2 (two) times a day, Disp: , Rfl:     albuterol (VENTOLIN HFA) 90 mcg/act inhaler, Inhale 2 puffs every 6 (six) hours as needed for shortness of breath, Disp: 18 g, Rfl: 0    brompheniramine-pseudoephedrine-DM 30-2-10 MG/5ML syrup, Take 10 mL by mouth 4 (four) times a day as needed for cough, Disp: 120 mL, Rfl: 0    predniSONE 10 mg tablet, Take once daily all days pills on this schedule 6- 6- 5- 4- 3- 2- 1 (Patient not taking: Reported on 11/17/2019), Disp: 27 tablet, Rfl: 0    valACYclovir (VALTREX) 500 mg tablet, Take 500 mg by mouth, Disp: , Rfl:     VYVANSE 40 MG capsule, , Disp: , Rfl:     Current Allergies     Allergies as of 11/17/2019    (No Known Allergies)            The following portions of the patient's history were reviewed and updated as appropriate: allergies, current medications, past family history, past medical history, past social history, past surgical history and problem list      Past Medical History:   Diagnosis Date    PCOS (polycystic ovarian syndrome)        Past Surgical History:   Procedure Laterality Date    ADENOIDECTOMY      TONSILLECTOMY         Family History   Problem Relation Age of Onset    No Known Problems Mother  No Known Problems Father          Medications have been verified  Objective   /79   Pulse 85   Temp 97 9 °F (36 6 °C)   Ht 5' 6" (1 676 m)   Wt 114 kg (251 lb 5 2 oz)   SpO2 97%   BMI 40 56 kg/m²        Physical Exam     Physical Exam   Constitutional: She is oriented to person, place, and time  She appears well-developed and well-nourished  No distress  HENT:   Head: Normocephalic  Right Ear: External ear normal    Left Ear: External ear normal    Nose: Nose normal    Mouth/Throat: Oropharynx is clear and moist  No oropharyngeal exudate  Eyes: Conjunctivae are normal    Cardiovascular: Normal rate, regular rhythm, normal heart sounds and intact distal pulses  Exam reveals no gallop and no friction rub  No murmur heard  Pulmonary/Chest: Effort normal and breath sounds normal  No respiratory distress  She has no wheezes  She has no rales  She exhibits no tenderness  Lymphadenopathy:     She has no cervical adenopathy  Neurological: She is alert and oriented to person, place, and time  Skin: Skin is warm  She is not diaphoretic  Psychiatric: She has a normal mood and affect  Her behavior is normal  Judgment and thought content normal    Vitals reviewed

## 2019-11-17 NOTE — LETTER
November 20, 2019     Patient: Servando Cabrales   YOB: 1995   Date of Visit: 11/17/2019       To Whom it May Concern:    Servando Cabrales was seen in my clinic on 11/17/2019  Please excuse Merline Pancake on 11/20/2019 and 11/21/2019  She may return to work on 11/22/2019  If you have any questions or concerns, please don't hesitate to call           Sincerely,          Sahyna Chatman PA-C        CC: No Recipients

## 2019-11-17 NOTE — PATIENT INSTRUCTIONS
Take Bromfed DM as prescribed  Albuterol every 4-6 hours  Mucinex during the day  Some studies suggest that Zinc 12 5-15mg every 2 hours while awake x 5 days may shorten the duration cold symptoms by 1-2 days  Fluids and rest  Nasal saline spray; Afrin if severe congestion (do not use for more than 3 days)  Tylenol/Ibuprofen for pain/fever  Warm compresses over sinuses  Steam treatment (utilize proper safety precautions when in contact with hot water/steam)  Follow up with PCP in 3-5 days  Proceed to  ER if symptoms worsen  Acute Bronchitis   WHAT YOU NEED TO KNOW:   Acute bronchitis is swelling and irritation in the air passages of your lungs  This irritation may cause you to cough or have other breathing problems  Acute bronchitis often starts because of another illness, such as a cold or the flu  The illness spreads from your nose and throat to your windpipe and airways  Bronchitis is often called a chest cold  Acute bronchitis lasts about 3 to 6 weeks and is usually not a serious illness  Your cough can last for several weeks  DISCHARGE INSTRUCTIONS:   Return to the emergency department if:   · You cough up blood  · Your lips or fingernails turn blue  · You feel like you are not getting enough air when you breathe  Contact your healthcare provider if:   · You have a fever  · Your breathing problems do not go away or get worse  · Your cough does not get better within 4 weeks  · You have questions or concerns about your condition or care  Self-care:   · Get more rest   Rest helps your body to heal  Slowly start to do more each day  Rest when you feel it is needed  · Avoid irritants in the air  Avoid chemicals, fumes, and dust  Wear a face mask if you must work around dust or fumes  Stay inside on days when air pollution levels are high  If you have allergies, stay inside when pollen counts are high   Do not use aerosol products, such as spray-on deodorant, bug spray, and hair spray     · Do not smoke or be around others who smoke  Nicotine and other chemicals in cigarettes and cigars damages the cilia that move mucus out of your lungs  Ask your healthcare provider for information if you currently smoke and need help to quit  E-cigarettes or smokeless tobacco still contain nicotine  Talk to your healthcare provider before you use these products  · Drink liquids as directed  Liquids help keep your air passages moist and help you cough up mucus  You may need to drink more liquids when you have acute bronchitis  Ask how much liquid to drink each day and which liquids are best for you  · Use a humidifier or vaporizer  Use a cool mist humidifier or a vaporizer to increase air moisture in your home  This may make it easier for you to breathe and help decrease your cough  Decrease risk for acute bronchitis:   · Get the vaccinations you need  Ask your healthcare provider if you should get vaccinated against the flu or pneumonia  · Prevent the spread of germs  You can decrease your risk of acute bronchitis and other illnesses by doing the following:     [facility] your hands often with soap and water  Carry germ-killing hand lotion or gel with you  You can use the lotion or gel to clean your hands when soap and water are not available  ¨ Do not touch your eyes, nose, or mouth unless you have washed your hands first     ¨ Always cover your mouth when you cough to prevent the spread of germs  It is best to cough into a tissue or your shirt sleeve instead of into your hand  Ask those around you cover their mouths when they cough  ¨ Try to avoid people who have a cold or the flu  If you are sick, stay away from others as much as possible  Medicines: Your healthcare provider may  give you any of the following:  · Ibuprofen or acetaminophen  are medicines that help lower your fever  They are available without a doctor's order   Ask your healthcare provider which medicine is right for you  Ask how much to take and how often to take it  Follow directions  These medicines can cause stomach bleeding if not taken correctly  Ibuprofen can cause kidney damage  Do not take ibuprofen if you have kidney disease, an ulcer, or allergies to aspirin  Acetaminophen can cause liver damage  Do not take more than 4,000 milligrams in 24 hours  · Decongestants  help loosen mucus in your lungs and make it easier to cough up  This can help you breathe easier  · Cough suppressants  decrease your urge to cough  If your cough produces mucus, do not take a cough suppressant unless your healthcare provider tells you to  Your healthcare provider may suggest that you take a cough suppressant at night so you can rest     · Inhalers  may be given  Your healthcare provider may give you one or more inhalers to help you breathe easier and cough less  An inhaler gives your medicine to open your airways  Ask your healthcare provider to show you how to use your inhaler correctly  · Take your medicine as directed  Contact your healthcare provider if you think your medicine is not helping or if you have side effects  Tell him of her if you are allergic to any medicine  Keep a list of the medicines, vitamins, and herbs you take  Include the amounts, and when and why you take them  Bring the list or the pill bottles to follow-up visits  Carry your medicine list with you in case of an emergency  Follow up with your healthcare provider as directed:  Write down questions you have so you will remember to ask them during your follow-up visits  © 2017 [employer] Information is for End User's use only and may not be sold, redistributed or otherwise used for commercial purposes  All illustrations and images included in CareNotes® are the copyrighted property of [employer]  or [employer]  The above information is an  only   It is not intended as medical advice for individual conditions or treatments  Talk to your doctor, nurse or pharmacist before following any medical regimen to see if it is safe and effective for you

## 2019-11-17 NOTE — LETTER
November 17, 2019     Patient: Guerda Quintero   YOB: 1995   Date of Visit: 11/17/2019       To Whom It May Concern: It is my medical opinion that Guerda Quintero may return to work on 11/19/2019  If you have any questions or concerns, please don't hesitate to call           Sincerely,        Liborio Romero PA-C    CC: No Recipients

## 2020-01-04 ENCOUNTER — HOSPITAL ENCOUNTER (EMERGENCY)
Facility: HOSPITAL | Age: 25
Discharge: HOME | End: 2020-01-04
Attending: EMERGENCY MEDICINE | Admitting: EMERGENCY MEDICINE
Payer: COMMERCIAL

## 2020-01-04 VITALS
TEMPERATURE: 97.3 F | HEART RATE: 96 BPM | RESPIRATION RATE: 6 BRPM | WEIGHT: 258.82 LBS | SYSTOLIC BLOOD PRESSURE: 132 MMHG | OXYGEN SATURATION: 100 % | DIASTOLIC BLOOD PRESSURE: 80 MMHG | BODY MASS INDEX: 41.77 KG/M2

## 2020-01-04 DIAGNOSIS — S30.0XXA CONTUSION OF BUTTOCK: Primary | ICD-10-CM

## 2020-01-04 DIAGNOSIS — Z34.90 EARLY STAGE OF PREGNANCY: ICD-10-CM

## 2020-01-04 LAB
BACTERIA UR QL AUTO: ABNORMAL /HPF
BILIRUB UR QL STRIP: NEGATIVE
CLARITY UR: ABNORMAL
COLOR UR: YELLOW
GLUCOSE UR STRIP-MCNC: NEGATIVE MG/DL
HGB UR QL STRIP.AUTO: NEGATIVE
KETONES UR STRIP-MCNC: NEGATIVE MG/DL
LEUKOCYTE ESTERASE UR QL STRIP: ABNORMAL
MUCOUS THREADS UR QL AUTO: ABNORMAL
NITRITE UR QL STRIP: NEGATIVE
NON-SQ EPI CELLS URNS QL MICRO: ABNORMAL /HPF
OTHER STN SPEC: ABNORMAL
PH UR STRIP.AUTO: 6 [PH]
PROT UR STRIP-MCNC: NEGATIVE MG/DL
RBC #/AREA URNS AUTO: ABNORMAL /HPF
SP GR UR STRIP.AUTO: >=1.03 (ref 1–1.03)
UROBILINOGEN UR QL STRIP.AUTO: 0.2 E.U./DL
WBC #/AREA URNS AUTO: ABNORMAL /HPF

## 2020-01-04 PROCEDURE — 99282 EMERGENCY DEPT VISIT SF MDM: CPT | Performed by: EMERGENCY MEDICINE

## 2020-01-04 PROCEDURE — 87086 URINE CULTURE/COLONY COUNT: CPT | Performed by: EMERGENCY MEDICINE

## 2020-01-04 PROCEDURE — 81001 URINALYSIS AUTO W/SCOPE: CPT | Performed by: EMERGENCY MEDICINE

## 2020-01-04 PROCEDURE — 99283 EMERGENCY DEPT VISIT LOW MDM: CPT

## 2020-01-04 NOTE — DISCHARGE INSTRUCTIONS
Return immediately if worse or any new symptoms  Tylenol 1000 mg every 6 hours as needed for pain  Please call your obstetrician in 2 days and arrange evaluation within 3-5 days

## 2020-01-04 NOTE — ED PROVIDER NOTES
History  Chief Complaint   Patient presents with    Fall     pt fell and wants to make sure nothing is wrong with her due to a recent pregnancy  pt doesn't have any bleeding or cramping  Fall earlier onto buttocks down 5 steps  No head injury headache  No neck injury or neck pain  No chest pain or dyspnea  No abdominal pain  Saw obstetrician and was having intermittent crampy abdominal discomfort and was related to pregnancy, positive serum pregnancy test  No vaginal bleeding  Urinating frequently but no discomfort or hematuria              Prior to Admission Medications   Prescriptions Last Dose Informant Patient Reported? Taking?    VYVANSE 40 MG capsule Not Taking at Unknown time  Yes No   albuterol (VENTOLIN HFA) 90 mcg/act inhaler Past Month at Unknown time  No Yes   Sig: Inhale 2 puffs every 6 (six) hours as needed for shortness of breath   brompheniramine-pseudoephedrine-DM 30-2-10 MG/5ML syrup Not Taking at Unknown time  No No   Sig: Take 10 mL by mouth 4 (four) times a day as needed for cough   Patient not taking: Reported on 2020   lisdexamfetamine (VYVANSE) 50 MG capsule Not Taking at Unknown time  Yes No   Sig: Take 1 capsule by mouth   predniSONE 10 mg tablet Not Taking at Unknown time  No No   Sig: Take once daily all days pills on this schedule 6- 6- 5- 4- 3- 2- 1   Patient not taking: Reported on 2019   valACYclovir (VALTREX) 1,000 mg tablet Not Taking at Unknown time  Yes No   Sig: Take 500 mg by mouth 2 (two) times a day   valACYclovir (VALTREX) 500 mg tablet Not Taking at Unknown time  Yes No   Sig: Take 500 mg by mouth      Facility-Administered Medications: None       Past Medical History:   Diagnosis Date    PCOS (polycystic ovarian syndrome)        Past Surgical History:   Procedure Laterality Date    ADENOIDECTOMY      TONSILLECTOMY         Family History   Problem Relation Age of Onset    No Known Problems Mother     No Known Problems Father      I have reviewed and agree with the history as documented  Social History     Tobacco Use    Smoking status: Never Smoker    Smokeless tobacco: Never Used   Substance Use Topics    Alcohol use: Yes     Frequency: 2-4 times a month    Drug use: Never        Review of Systems   All other systems reviewed and are negative  Physical Exam  Physical Exam   Constitutional: She is oriented to person, place, and time  She appears well-developed and well-nourished  HENT:   Head: Normocephalic and atraumatic  Eyes: Pupils are equal, round, and reactive to light  Conjunctivae are normal    Neck: Neck supple  Neck nontender, no deformity   Cardiovascular: Normal rate, regular rhythm and normal heart sounds  Pulmonary/Chest: Effort normal and breath sounds normal    Abdominal: Soft  Bowel sounds are normal  She exhibits no distension  Minimal suprapubic tenderness   Musculoskeletal: Normal range of motion  Bilateral buttock and sacral area tenderness without deformity, no overlying abrasion or bruising changes, no deformity   Neurological: She is alert and oriented to person, place, and time  No cranial nerve deficit  Coordination normal    Skin: Skin is warm and dry  Psychiatric: She has a normal mood and affect  Her behavior is normal  Judgment and thought content normal    Nursing note and vitals reviewed        Vital Signs  ED Triage Vitals [01/04/20 1326]   Temperature Pulse Respirations Blood Pressure SpO2   (!) 97 3 °F (36 3 °C) 89 16 113/89 100 %      Temp Source Heart Rate Source Patient Position - Orthostatic VS BP Location FiO2 (%)   Temporal Monitor -- Right arm --      Pain Score       2           Vitals:    01/04/20 1326 01/04/20 1330   BP: 113/89 133/89   Pulse: 89 100         Visual Acuity  Visual Acuity      Most Recent Value   L Pupil Size (mm)  4   R Pupil Size (mm)  4          ED Medications  Medications - No data to display    Diagnostic Studies  Results Reviewed     Procedure Component Value Units Date/Time    Urine Microscopic [[id number]]  (Abnormal) Collected:  01/04/20 1340    Lab Status:  Final result Specimen:  Urine, Clean Catch Updated:  01/04/20 1355     RBC, UA None Seen /hpf      WBC, UA 2-4 /hpf      Epithelial Cells Innumerable /hpf      Bacteria, UA Occasional /hpf      OTHER OBSERVATIONS Sperm Present     MUCUS THREADS Moderate     URINE COMMENT --    UA w Reflex to Microscopic w Reflex to Culture [[id number]]  (Abnormal) Collected:  01/04/20 1340    Lab Status:  Final result Specimen:  Urine, Clean Catch Updated:  01/04/20 1347     Color, UA Yellow     Clarity, UA Slightly Cloudy     Specific Gravity, UA >=1 030     pH, UA 6 0     Leukocytes, UA Trace     Nitrite, UA Negative     Protein, UA Negative mg/dl      Glucose, UA Negative mg/dl      Ketones, UA Negative mg/dl      Urobilinogen, UA 0 2 E U /dl      Bilirubin, UA Negative     Blood, UA Negative     URINE COMMENT --    Urine culture [[id number]] Collected:  01/04/20 1340    Lab Status: In process Specimen:  Urine, Clean Catch Updated:  01/04/20 1347                 No orders to display              Procedures  Procedures         ED Course  ED Course as of Jan 04 1414   Sat Jan 04, 2020   1406 Transabdominal ultrasound reveals intrauterine pregnancy  Tolerated the exam well without discomfort  With a good understanding  Patient follow up with obstetrician                                  MDM      Disposition  Final diagnoses:   Contusion of buttock   Early stage of pregnancy     Time reflects when diagnosis was documented in both MDM as applicable and the Disposition within this note     Time User Action Codes Description Comment    1/4/2020  2:09 PM Diop Lennox Add [S30  0XXA] Contusion of buttock     1/4/2020  2:10 PM Diop Lennox Add [Z34 90] Early stage of pregnancy       ED Disposition     ED Disposition Condition Date/Time Comment    Discharge Stable Sat Jan 4, 2020  2:09 PM Walda Rinne discharge to home/self care  Follow-up Information     Follow up With Specialties Details Why [address] IV, MD Family Medicine Schedule an appointment as soon as possible for a visit in 1 week  [place] [address] [address]  [phone]            Patient's Medications   Discharge Prescriptions    No medications on file     No discharge procedures on file      ED Provider  Electronically Signed by           Everardo Victoria DO  01/04/20 4132

## 2020-01-05 LAB — BACTERIA UR CULT: NORMAL

## 2020-08-14 ENCOUNTER — OFFICE VISIT (OUTPATIENT)
Dept: URGENT CARE | Facility: CLINIC | Age: 25
End: 2020-08-14
Payer: COMMERCIAL

## 2020-08-14 VITALS
TEMPERATURE: 98 F | WEIGHT: 270 LBS | OXYGEN SATURATION: 99 % | RESPIRATION RATE: 20 BRPM | HEART RATE: 78 BPM | HEIGHT: 66 IN | BODY MASS INDEX: 43.39 KG/M2

## 2020-08-14 DIAGNOSIS — Z20.822 ENCOUNTER FOR LABORATORY TESTING FOR COVID-19 VIRUS: ICD-10-CM

## 2020-08-14 DIAGNOSIS — J06.9 VIRAL URI WITH COUGH: Primary | ICD-10-CM

## 2020-08-14 PROCEDURE — U0003 INFECTIOUS AGENT DETECTION BY NUCLEIC ACID (DNA OR RNA); SEVERE ACUTE RESPIRATORY SYNDROME CORONAVIRUS 2 (SARS-COV-2) (CORONAVIRUS DISEASE [COVID-19]), AMPLIFIED PROBE TECHNIQUE, MAKING USE OF HIGH THROUGHPUT TECHNOLOGIES AS DESCRIBED BY CMS-2020-01-R: HCPCS | Performed by: EMERGENCY MEDICINE

## 2020-08-14 PROCEDURE — 99213 OFFICE O/P EST LOW 20 MIN: CPT | Performed by: EMERGENCY MEDICINE

## 2020-08-14 NOTE — PATIENT INSTRUCTIONS
You have been diagnosed with a Viral Upper Respiratory infection and your symptoms should resolve over the next 7 to 10 days with the treatments recommended today  If they do not, it is possible that you have developed a bacterial infection and you should return  If you were to take an antibiotic while you are still in the viral stage, you will not get better any faster, but could kill off the good germs in your body as well as make the germs in you resistant to the antibiotic  Take an expectorant - guaifenesin should be the only ingredient - during the day, and the cough suppressant (ex  Robitussin DM or Tessalon) if needed at night only  Take Zinc 12 5 to 15 mg every 2 - 3 hrs while awake for the next few days  You may take Cold Steven (13 3 mg of Zinc) or split a 25 mg Zinc tablet or lozenge in two or a 50 mg into four to get the proper dose  The total daily dose of Zinc should exceed 75 mg per day  You may also take a decongestant like Sudafed, unless you have hypertension or cardiac disease  Hold any NSAIDs like Ibuprofen (Advil), Naprosyn (Aleve), etc while on steroids like Medrol or Prednisone  If you are diabetic, you should also adhere strictly to your diet and monitor your blood sugar closely while on the steroids as discussed  Discontinue the steroid immediately if your blood sugar gets out of control  Upper Respiratory Infection   AMBULATORY CARE:   An upper respiratory infection  is also called a common cold  It can affect your nose, throat, ears, and sinuses  Common signs and symptoms include the following:  Cold symptoms are usually worst for the first 3 to 5 days  You may have any of the following:  Runny or stuffy nose    Sneezing and coughing    Sore throat or hoarseness    Red, watery, and sore eyes    Fatigue     Chills and fever    Headache, body aches, or sore muscles  Seek care immediately if:   You have chest pain or trouble breathing      Contact your healthcare provider if:   You have a fever over 102ºF (39°C)  Your sore throat gets worse or you see white or yellow spots in your throat  Your symptoms get worse after 3 to 5 days or your cold is not better in 14 days  You have a rash anywhere on your skin  You have large, tender lumps in your neck  You have thick, green or yellow drainage from your nose  You cough up thick yellow, green, or bloody mucus  You have vomiting for more than 24 hours and cannot keep fluids down  You have a bad earache  You have questions or concerns about your condition or care  Treatment for a cold: There is no cure for the common cold  Colds are caused by viruses and do not get better with antibiotics  Most people get better in 7 to 14 days  You may continue to cough for 2 to 3 weeks  The following may help decrease your symptoms:  Decongestants  help reduce nasal congestion and help you breathe more easily  If you take decongestant pills, they may make you feel restless or not able to sleep  Do not use decongestant sprays for more than a few days  Cough suppressants  help reduce coughing  Ask your healthcare provider which type of cough medicine is best for you  NSAIDs , such as ibuprofen, help decrease swelling, pain, and fever  NSAIDs can cause stomach bleeding or kidney problems in certain people  If you take blood thinner medicine, always ask your healthcare provider if NSAIDs are safe for you  Always read the medicine label and follow directions  Acetaminophen  decreases pain and fever  It is available without a doctor's order  Ask how much to take and how often to take it  Follow directions  Read the labels of all other medicines you are using to see if they also contain acetaminophen, or ask your doctor or pharmacist  Acetaminophen can cause liver damage if not taken correctly  Do not use more than 4 grams (4,000 milligrams) total of acetaminophen in one day  Manage your cold:   Rest as much as possible    Slowly start to do more each day  Drink more liquids as directed  Liquids will help thin and loosen mucus so you can cough it up  Liquids will also help prevent dehydration  Liquids that help prevent dehydration include water, fruit juice, and broth  Do not drink liquids that contain caffeine  Caffeine can increase your risk for dehydration  Ask your healthcare provider how much liquid to drink each day  Soothe a sore throat  Gargle with warm salt water  This helps your sore throat feel better  Make salt water by dissolving ¼ teaspoon salt in 1 cup warm water  You may also suck on hard candy or throat lozenges  You may use a sore throat spray  Use a humidifier or vaporizer  Use a cool mist humidifier or a vaporizer to increase air moisture in your home  This may make it easier for you to breathe and help decrease your cough  Use saline nasal drops as directed  These help relieve congestion  Apply petroleum-based jelly around the outside of your nostrils  This can decrease irritation from blowing your nose  Do not smoke  Nicotine and other chemicals in cigarettes and cigars can make your symptoms worse  They can also cause infections such as bronchitis or pneumonia  Ask your healthcare provider for information if you currently smoke and need help to quit  E-cigarettes or smokeless tobacco still contain nicotine  Talk to your healthcare provider before you use these products  Prevent spreading your cold to others:   Try to stay away from other people during the first 2 to 3 days of your cold when it is more easily spread  Do not share food or drinks  Do not share hand towels with household members  Wash your hands often, especially after you blow your nose  Turn away from other people and cover your mouth and nose with a tissue when you sneeze or cough  Follow up with your healthcare provider as directed:  Write down your questions so you remember to ask them during your visits  © 2017 [facility] [employer] Information is for End User's use only and may not be sold, redistributed or otherwise used for commercial purposes  All illustrations and images included in CareNotes® are the copyrighted property of [employer]  or Luis Medrano  The above information is an  only  It is not intended as medical advice for individual conditions or treatments  Talk to your doctor, nurse or pharmacist before following any medical regimen to see if it is safe and effective for you  [address]     Your healthcare provider and/or public health staff have evaluated you and have determined that you do not need to be hospitalized at this time  At this time you can be isolated at home where you will be monitored by staff from your local or state health department  You should carefully follow the prevention and isolation steps below until a healthcare provider or local or state health department says that you can return to your normal activities  Stay home except to get medical care     People who are mildly ill with COVID-19 are able to isolate at home during their illness  You should restrict activities outside your home, except for getting medical care  Do not go to work, school, or public areas  Avoid using public transportation, ride-sharing, or taxis  Separate yourself from other people and animals in your home     People: As much as possible, you should stay in a specific room and away from other people in your home  Also, you should use a separate bathroom, if available  Animals: You should restrict contact with pets and other animals while you are sick with COVID-19, just like you would around other people  Although there have not been reports of pets or other animals becoming sick with COVID-19, it is still recommended that people sick with COVID-19 limit contact with animals until more information is known about the virus  When possible, have another member of your household care for your animals while you are sick  If you are sick with COVID-19, avoid contact with your pet, including petting, snuggling, being kissed or licked, and sharing food  If you must care for your pet or be around animals while you are sick, wash your hands before and after you interact with pets and wear a facemask  See COVID-19 and Animals for more information  Call ahead before visiting your doctor     If you have a medical appointment, call the healthcare provider and tell them that you have or may have COVID-19  This will help the healthcare providers office take steps to keep other people from getting infected or exposed  Wear a facemask     You should wear a facemask when you are around other people (e g , sharing a room or vehicle) or pets and before you enter a healthcare providers office  If you are not able to wear a facemask (for example, because it causes trouble breathing), then people who live with you should not stay in the same room with you, or they should wear a facemask if they enter your room  Cover your coughs and sneezes     Cover your mouth and nose with a tissue when you cough or sneeze  Throw used tissues in a lined trash can  Immediately wash your hands with soap and water for at least 20 seconds or, if soap and water are not available, clean your hands with an alcohol-based hand  that contains at least 60% alcohol  Clean your hands often     Wash your hands often with soap and water for at least 20 seconds, especially after blowing your nose, coughing, or sneezing; going to the bathroom; and before eating or preparing food  If soap and water are not readily available, use an alcohol-based hand  with at least 60% alcohol, covering all surfaces of your hands and rubbing them together until they feel dry  Soap and water are the best option if hands are visibly dirty   Avoid touching your eyes, nose, and mouth with unwashed hands  Avoid sharing personal household items     You should not share dishes, drinking glasses, cups, eating utensils, towels, or bedding with other people or pets in your home  After using these items, they should be washed thoroughly with soap and water  Clean all high-touch surfaces everyday     High touch surfaces include counters, tabletops, doorknobs, bathroom fixtures, toilets, phones, keyboards, tablets, and bedside tables  Also, clean any surfaces that may have blood, stool, or body fluids on them  Use a household cleaning spray or wipe, according to the label instructions  Labels contain instructions for safe and effective use of the cleaning product including precautions you should take when applying the product, such as wearing gloves and making sure you have good ventilation during use of the product  Monitor your symptoms     Seek prompt medical attention if your illness is worsening (e g , difficulty breathing)  Before seeking care, call your healthcare provider and tell them that you have, or are being evaluated for, COVID-19  Put on a facemask before you enter the facility  These steps will help the healthcare providers office to keep other people in the office or waiting room from getting infected or exposed  Ask your healthcare provider to call the local or state health department  Persons who are placed under active monitoring or facilitated self-monitoring should follow instructions provided by their local health department or occupational health professionals, as appropriate  If you have a medical emergency and need to call 911, notify the dispatch personnel that you have, or are being evaluated for COVID-19  If possible, put on a facemask before emergency medical services arrive       Discontinuing home isolation     Patients with confirmed COVID-19 should remain under home isolation precautions until the risk of secondary transmission to others is thought to be low  The decision to discontinue home isolation precautions should be made on a case-by-case basis, in consultation with healthcare providers and state and local health departments  Source: [employer]      Proceed to ER if symptoms worsen

## 2020-08-14 NOTE — PROGRESS NOTES
[facility]        NAME: Nicholas Sánchez is a 23 y o  female  : 1995    MRN: [id number]  DATE: 2020  TIME: 7:51 PM    Assessment and Plan   Viral URI with cough [J06 9]  1  Viral URI with cough  Novel Coronavirus (COVID-19), PCR [facility] - Office Collection   2  Encounter for laboratory testing for COVID-19 virus           Patient Instructions     Patient Instructions     You have been diagnosed with a Viral Upper Respiratory infection and your symptoms should resolve over the next 7 to 10 days with the treatments recommended today  If they do not, it is possible that you have developed a bacterial infection and you should return  If you were to take an antibiotic while you are still in the viral stage, you will not get better any faster, but could kill off the good germs in your body as well as make the germs in you resistant to the antibiotic  Take an expectorant - guaifenesin should be the only ingredient - during the day, and the cough suppressant (ex  Robitussin DM or Tessalon) if needed at night only  Take Zinc 12 5 to 15 mg every 2 - 3 hrs while awake for the next few days  You may take Cold Steven (13 3 mg of Zinc) or split a 25 mg Zinc tablet or lozenge in two or a 50 mg into four to get the proper dose  The total daily dose of Zinc should exceed 75 mg per day  You may also take a decongestant like Sudafed, unless you have hypertension or cardiac disease  Hold any NSAIDs like Ibuprofen (Advil), Naprosyn (Aleve), etc while on steroids like Medrol or Prednisone  If you are diabetic, you should also adhere strictly to your diet and monitor your blood sugar closely while on the steroids as discussed  Discontinue the steroid immediately if your blood sugar gets out of control  Upper Respiratory Infection   AMBULATORY CARE:   An upper respiratory infection  is also called a common cold  It can affect your nose, throat, ears, and sinuses     Common signs and symptoms include the following:  Cold symptoms are usually worst for the first 3 to 5 days  You may have any of the following:  Runny or stuffy nose    Sneezing and coughing    Sore throat or hoarseness    Red, watery, and sore eyes    Fatigue     Chills and fever    Headache, body aches, or sore muscles  Seek care immediately if:   You have chest pain or trouble breathing  Contact your healthcare provider if:   You have a fever over 102ºF (39°C)  Your sore throat gets worse or you see white or yellow spots in your throat  Your symptoms get worse after 3 to 5 days or your cold is not better in 14 days  You have a rash anywhere on your skin  You have large, tender lumps in your neck  You have thick, green or yellow drainage from your nose  You cough up thick yellow, green, or bloody mucus  You have vomiting for more than 24 hours and cannot keep fluids down  You have a bad earache  You have questions or concerns about your condition or care  Treatment for a cold: There is no cure for the common cold  Colds are caused by viruses and do not get better with antibiotics  Most people get better in 7 to 14 days  You may continue to cough for 2 to 3 weeks  The following may help decrease your symptoms:  Decongestants  help reduce nasal congestion and help you breathe more easily  If you take decongestant pills, they may make you feel restless or not able to sleep  Do not use decongestant sprays for more than a few days  Cough suppressants  help reduce coughing  Ask your healthcare provider which type of cough medicine is best for you  NSAIDs , such as ibuprofen, help decrease swelling, pain, and fever  NSAIDs can cause stomach bleeding or kidney problems in certain people  If you take blood thinner medicine, always ask your healthcare provider if NSAIDs are safe for you  Always read the medicine label and follow directions  Acetaminophen  decreases pain and fever   It is available without a doctor's order  Ask how much to take and how often to take it  Follow directions  Read the labels of all other medicines you are using to see if they also contain acetaminophen, or ask your doctor or pharmacist  Acetaminophen can cause liver damage if not taken correctly  Do not use more than 4 grams (4,000 milligrams) total of acetaminophen in one day  Manage your cold:   Rest as much as possible  Slowly start to do more each day  Drink more liquids as directed  Liquids will help thin and loosen mucus so you can cough it up  Liquids will also help prevent dehydration  Liquids that help prevent dehydration include water, fruit juice, and broth  Do not drink liquids that contain caffeine  Caffeine can increase your risk for dehydration  Ask your healthcare provider how much liquid to drink each day  Soothe a sore throat  Gargle with warm salt water  This helps your sore throat feel better  Make salt water by dissolving ¼ teaspoon salt in 1 cup warm water  You may also suck on hard candy or throat lozenges  You may use a sore throat spray  Use a humidifier or vaporizer  Use a cool mist humidifier or a vaporizer to increase air moisture in your home  This may make it easier for you to breathe and help decrease your cough  Use saline nasal drops as directed  These help relieve congestion  Apply petroleum-based jelly around the outside of your nostrils  This can decrease irritation from blowing your nose  Do not smoke  Nicotine and other chemicals in cigarettes and cigars can make your symptoms worse  They can also cause infections such as bronchitis or pneumonia  Ask your healthcare provider for information if you currently smoke and need help to quit  E-cigarettes or smokeless tobacco still contain nicotine  Talk to your healthcare provider before you use these products    Prevent spreading your cold to others:   Try to stay away from other people during the first 2 to 3 days of your cold when it is more easily spread  Do not share food or drinks  Do not share hand towels with household members  Wash your hands often, especially after you blow your nose  Turn away from other people and cover your mouth and nose with a tissue when you sneeze or cough  Follow up with your healthcare provider as directed:  Write down your questions so you remember to ask them during your visits  © 2017 [address] [employer] Information is for End User's use only and may not be sold, redistributed or otherwise used for commercial purposes  All illustrations and images included in CareNotes® are the copyrighted property of A [employer] M [employer]  or Luis Medrano  The above information is an  only  It is not intended as medical advice for individual conditions or treatments  Talk to your doctor, nurse or pharmacist before following any medical regimen to see if it is safe and effective for you  [address] [place] [address]     Your healthcare provider and/or public health staff have evaluated you and have determined that you do not need to be hospitalized at this time  At this time you can be isolated at home where you will be monitored by staff from your local or state health department  You should carefully follow the prevention and isolation steps below until a healthcare provider or local or state health department says that you can return to your normal activities  Stay home except to get medical care     People who are mildly ill with COVID-19 are able to isolate at home during their illness  You should restrict activities outside your home, except for getting medical care  Do not go to work, school, or public areas  Avoid using public transportation, ride-sharing, or taxis  Separate yourself from other people and animals in your home     People: As much as possible, you should stay in a specific room and away from other people in your home   Also, you should use a separate bathroom, if available  Animals: You should restrict contact with pets and other animals while you are sick with COVID-19, just like you would around other people  Although there have not been reports of pets or other animals becoming sick with COVID-19, it is still recommended that people sick with COVID-19 limit contact with animals until more information is known about the virus  When possible, have another member of your household care for your animals while you are sick  If you are sick with COVID-19, avoid contact with your pet, including petting, snuggling, being kissed or licked, and sharing food  If you must care for your pet or be around animals while you are sick, wash your hands before and after you interact with pets and wear a facemask  See COVID-19 and Animals for more information  Call ahead before visiting your doctor     If you have a medical appointment, call the healthcare provider and tell them that you have or may have COVID-19  This will help the healthcare providers office take steps to keep other people from getting infected or exposed  Wear a facemask     You should wear a facemask when you are around other people (e g , sharing a room or vehicle) or pets and before you enter a healthcare providers office  If you are not able to wear a facemask (for example, because it causes trouble breathing), then people who live with you should not stay in the same room with you, or they should wear a facemask if they enter your room  Cover your coughs and sneezes     Cover your mouth and nose with a tissue when you cough or sneeze  Throw used tissues in a lined trash can  Immediately wash your hands with soap and water for at least 20 seconds or, if soap and water are not available, clean your hands with an alcohol-based hand  that contains at least 60% alcohol       Clean your hands often     Wash your hands often with soap and water for at least 20 seconds, especially after blowing your nose, coughing, or sneezing; going to the bathroom; and before eating or preparing food  If soap and water are not readily available, use an alcohol-based hand  with at least 60% alcohol, covering all surfaces of your hands and rubbing them together until they feel dry  Soap and water are the best option if hands are visibly dirty  Avoid touching your eyes, nose, and mouth with unwashed hands  Avoid sharing personal household items     You should not share dishes, drinking glasses, cups, eating utensils, towels, or bedding with other people or pets in your home  After using these items, they should be washed thoroughly with soap and water  Clean all high-touch surfaces everyday     High touch surfaces include counters, tabletops, doorknobs, bathroom fixtures, toilets, phones, keyboards, tablets, and bedside tables  Also, clean any surfaces that may have blood, stool, or body fluids on them  Use a household cleaning spray or wipe, according to the label instructions  Labels contain instructions for safe and effective use of the cleaning product including precautions you should take when applying the product, such as wearing gloves and making sure you have good ventilation during use of the product  Monitor your symptoms     Seek prompt medical attention if your illness is worsening (e g , difficulty breathing)  Before seeking care, call your healthcare provider and tell them that you have, or are being evaluated for, COVID-19  Put on a facemask before you enter the facility  These steps will help the healthcare providers office to keep other people in the office or waiting room from getting infected or exposed  Ask your healthcare provider to call the local or [facility] health department   Persons who are placed under active monitoring or facilitated self-monitoring should follow instructions provided by their local health department or occupational health professionals, as appropriate  If you have a medical emergency and need to call 911, notify the dispatch personnel that you have, or are being evaluated for COVID-19  If possible, put on a facemask before emergency medical services arrive  Discontinuing home isolation     Patients with confirmed COVID-19 should remain under home isolation precautions until the risk of secondary transmission to others is thought to be low  The decision to discontinue home isolation precautions should be made on a case-by-case basis, in consultation with healthcare providers and state and local health departments  Source: [employer]      Proceed to ER if symptoms worsen  Follow up with PCP in 3-5 days  Proceed to  ER if symptoms worsen  Chief Complaint     Chief Complaint   Patient presents with    COVID-19     Started Coughing today and mucus Started last night          History of Present Illness       Patient complains of cough and congestion since last night  She had an uneventful spontaneous vaginal delivery 5 days ago  She is not breastfeeding  Review of Systems   Review of Systems   Constitutional: Negative for chills and fever  HENT: Positive for congestion, rhinorrhea and sinus pressure  Negative for sore throat, trouble swallowing and voice change  Respiratory: Positive for cough  Negative for chest tightness, shortness of breath and wheezing  Cardiovascular: Negative for chest pain           Current Medications       Current Outpatient Medications:     albuterol (VENTOLIN HFA) 90 mcg/act inhaler, Inhale 2 puffs every 6 (six) hours as needed for shortness of breath, Disp: 18 g, Rfl: 0    brompheniramine-pseudoephedrine-DM 30-2-10 MG/5ML syrup, Take 10 mL by mouth 4 (four) times a day as needed for cough (Patient not taking: Reported on 1/4/2020), Disp: 120 mL, Rfl: 0    lisdexamfetamine (VYVANSE) 50 MG capsule, Take 1 capsule by mouth, Disp: , Rfl:     predniSONE 10 mg tablet, Take once daily all days pills on this schedule 6- 6- 5- 4- 3- 2- 1 (Patient not taking: Reported on 11/17/2019), Disp: 27 tablet, Rfl: 0    valACYclovir (VALTREX) 1,000 mg tablet, Take 500 mg by mouth 2 (two) times a day, Disp: , Rfl:     valACYclovir (VALTREX) 500 mg tablet, Take 500 mg by mouth, Disp: , Rfl:     VYVANSE 40 MG capsule, , Disp: , Rfl:     Current Allergies     Allergies as of 08/14/2020    (No Known Allergies)            The following portions of the patient's history were reviewed and updated as appropriate: allergies, current medications, past family history, past medical history, past social history, past surgical history and problem list      Past Medical History:   Diagnosis Date    PCOS (polycystic ovarian syndrome)        Past Surgical History:   Procedure Laterality Date    ADENOIDECTOMY      TONSILLECTOMY         Family History   Problem Relation Age of Onset    No Known Problems Mother     No Known Problems Father          Medications have been verified  Objective   Ht 5' 6" (1 676 m)   Wt 122 kg (270 lb)   LMP 11/02/2019   Breastfeeding No   BMI 43 58 kg/m²        Physical Exam     Physical Exam  Vitals signs and nursing note reviewed  Constitutional:       General: She is not in acute distress  Appearance: She is well-developed  HENT:      Head: Normocephalic and atraumatic  Nose: Mucosal edema present  Mouth/Throat:      Pharynx: Posterior oropharyngeal erythema present  No oropharyngeal exudate  Tonsils: No tonsillar abscesses  Comments: Mild erythema oropharynx, nasal mucosa congested  Neck:      Musculoskeletal: Neck supple  Cardiovascular:      Rate and Rhythm: Normal rate and regular rhythm  Pulmonary:      Effort: Pulmonary effort is normal  No respiratory distress  Breath sounds: No wheezing or rales  Skin:     General: Skin is warm and dry     Neurological: Mental Status: She is alert and oriented to person, place, and time  Psychiatric:         Behavior: Behavior normal          Thought Content:  Thought content normal          Judgment: Judgment normal

## 2020-08-15 ENCOUNTER — TELEPHONE (OUTPATIENT)
Dept: URGENT CARE | Facility: CLINIC | Age: 25
End: 2020-08-15

## 2020-08-17 ENCOUNTER — TELEPHONE (OUTPATIENT)
Dept: URGENT CARE | Facility: CLINIC | Age: 25
End: 2020-08-17

## 2020-08-17 LAB — SARS-COV-2 RNA SPEC QL NAA+PROBE: NOT DETECTED

## 2020-10-27 ENCOUNTER — OFFICE VISIT (OUTPATIENT)
Dept: URGENT CARE | Facility: CLINIC | Age: 25
End: 2020-10-27
Payer: COMMERCIAL

## 2020-10-27 VITALS
OXYGEN SATURATION: 99 % | WEIGHT: 240 LBS | HEIGHT: 66 IN | BODY MASS INDEX: 38.57 KG/M2 | TEMPERATURE: 98.7 F | HEART RATE: 70 BPM | RESPIRATION RATE: 16 BRPM

## 2020-10-27 DIAGNOSIS — B34.9 VIRAL SYNDROME: Primary | ICD-10-CM

## 2020-10-27 PROCEDURE — U0003 INFECTIOUS AGENT DETECTION BY NUCLEIC ACID (DNA OR RNA); SEVERE ACUTE RESPIRATORY SYNDROME CORONAVIRUS 2 (SARS-COV-2) (CORONAVIRUS DISEASE [COVID-19]), AMPLIFIED PROBE TECHNIQUE, MAKING USE OF HIGH THROUGHPUT TECHNOLOGIES AS DESCRIBED BY CMS-2020-01-R: HCPCS | Performed by: PHYSICIAN ASSISTANT

## 2020-10-27 PROCEDURE — G0382 LEV 3 HOSP TYPE B ED VISIT: HCPCS | Performed by: PHYSICIAN ASSISTANT

## 2020-10-27 RX ORDER — VENLAFAXINE HYDROCHLORIDE 37.5 MG/1
37.5 CAPSULE, EXTENDED RELEASE ORAL
COMMUNITY
Start: 2020-10-19 | End: 2021-02-05

## 2020-10-27 RX ORDER — LAMOTRIGINE 25 MG/1
TABLET ORAL
COMMUNITY
Start: 2020-10-20 | End: 2021-02-05

## 2020-10-28 ENCOUNTER — TELEPHONE (OUTPATIENT)
Dept: URGENT CARE | Facility: CLINIC | Age: 25
End: 2020-10-28

## 2020-10-28 LAB — SARS-COV-2 RNA SPEC QL NAA+PROBE: NOT DETECTED

## 2020-11-19 ENCOUNTER — HOSPITAL ENCOUNTER (EMERGENCY)
Facility: HOSPITAL | Age: 25
Discharge: HOME | End: 2020-11-19
Attending: EMERGENCY MEDICINE | Admitting: EMERGENCY MEDICINE
Payer: COMMERCIAL

## 2020-11-19 ENCOUNTER — APPOINTMENT (EMERGENCY)
Dept: RADIOLOGY | Facility: HOSPITAL | Age: 25
End: 2020-11-19
Payer: COMMERCIAL

## 2020-11-19 VITALS
BODY MASS INDEX: 43.39 KG/M2 | DIASTOLIC BLOOD PRESSURE: 80 MMHG | WEIGHT: 270 LBS | OXYGEN SATURATION: 98 % | SYSTOLIC BLOOD PRESSURE: 120 MMHG | HEIGHT: 66 IN | TEMPERATURE: 98.4 F | HEART RATE: 89 BPM | RESPIRATION RATE: 16 BRPM

## 2020-11-19 DIAGNOSIS — N93.9 VAGINAL BLEEDING: Primary | ICD-10-CM

## 2020-11-19 DIAGNOSIS — J02.9 PHARYNGITIS: ICD-10-CM

## 2020-11-19 DIAGNOSIS — R52 GENERALIZED BODY ACHES: ICD-10-CM

## 2020-11-19 LAB
ALBUMIN SERPL BCP-MCNC: 3.2 G/DL (ref 3.5–5)
ALP SERPL-CCNC: 72 U/L (ref 46–116)
ALT SERPL W P-5'-P-CCNC: 22 U/L (ref 12–78)
ANION GAP SERPL CALCULATED.3IONS-SCNC: 7 MMOL/L (ref 4–13)
AST SERPL W P-5'-P-CCNC: 11 U/L (ref 5–45)
BASOPHILS # BLD AUTO: 0.03 THOUSANDS/ΜL (ref 0–0.1)
BASOPHILS NFR BLD AUTO: 0 % (ref 0–1)
BILIRUB SERPL-MCNC: 0.29 MG/DL (ref 0.2–1)
BILIRUB UR QL STRIP: NEGATIVE
BUN SERPL-MCNC: 10 MG/DL (ref 5–25)
CALCIUM ALBUM COR SERPL-MCNC: 8.9 MG/DL (ref 8.3–10.1)
CALCIUM SERPL-MCNC: 8.3 MG/DL (ref 8.3–10.1)
CHLORIDE SERPL-SCNC: 104 MMOL/L (ref 100–108)
CLARITY UR: CLEAR
CO2 SERPL-SCNC: 26 MMOL/L (ref 21–32)
COLOR UR: YELLOW
CREAT SERPL-MCNC: 0.85 MG/DL (ref 0.6–1.3)
EOSINOPHIL # BLD AUTO: 0.13 THOUSAND/ΜL (ref 0–0.61)
EOSINOPHIL NFR BLD AUTO: 1 % (ref 0–6)
ERYTHROCYTE [DISTWIDTH] IN BLOOD BY AUTOMATED COUNT: 12.9 % (ref 11.6–15.1)
EXT PREG TEST URINE: NEGATIVE
EXT. CONTROL ED NAV: NORMAL
FLUAV RNA RESP QL NAA+PROBE: NEGATIVE
FLUBV RNA RESP QL NAA+PROBE: NEGATIVE
GFR SERPL CREATININE-BSD FRML MDRD: 97 ML/MIN/1.73SQ M
GLUCOSE SERPL-MCNC: 100 MG/DL (ref 65–140)
GLUCOSE UR STRIP-MCNC: NEGATIVE MG/DL
HCT VFR BLD AUTO: 38 % (ref 34.8–46.1)
HGB BLD-MCNC: 12.5 G/DL (ref 11.5–15.4)
HGB UR QL STRIP.AUTO: NEGATIVE
IMM GRANULOCYTES # BLD AUTO: 0.03 THOUSAND/UL (ref 0–0.2)
IMM GRANULOCYTES NFR BLD AUTO: 0 % (ref 0–2)
KETONES UR STRIP-MCNC: NEGATIVE MG/DL
LACTATE SERPL-SCNC: 1.3 MMOL/L (ref 0.5–2)
LEUKOCYTE ESTERASE UR QL STRIP: NEGATIVE
LYMPHOCYTES # BLD AUTO: 1.46 THOUSANDS/ΜL (ref 0.6–4.47)
LYMPHOCYTES NFR BLD AUTO: 14 % (ref 14–44)
MCH RBC QN AUTO: 28.9 PG (ref 26.8–34.3)
MCHC RBC AUTO-ENTMCNC: 32.9 G/DL (ref 31.4–37.4)
MCV RBC AUTO: 88 FL (ref 82–98)
MONOCYTES # BLD AUTO: 0.76 THOUSAND/ΜL (ref 0.17–1.22)
MONOCYTES NFR BLD AUTO: 8 % (ref 4–12)
NEUTROPHILS # BLD AUTO: 7.73 THOUSANDS/ΜL (ref 1.85–7.62)
NEUTS SEG NFR BLD AUTO: 77 % (ref 43–75)
NITRITE UR QL STRIP: NEGATIVE
NRBC BLD AUTO-RTO: 0 /100 WBCS
PH UR STRIP.AUTO: 7 [PH]
PLATELET # BLD AUTO: 188 THOUSANDS/UL (ref 149–390)
PMV BLD AUTO: 10.2 FL (ref 8.9–12.7)
POTASSIUM SERPL-SCNC: 3.9 MMOL/L (ref 3.5–5.3)
PROT SERPL-MCNC: 6.3 G/DL (ref 6.4–8.2)
PROT UR STRIP-MCNC: NEGATIVE MG/DL
RBC # BLD AUTO: 4.33 MILLION/UL (ref 3.81–5.12)
RSV RNA RESP QL NAA+PROBE: NEGATIVE
S PYO DNA THROAT QL NAA+PROBE: NORMAL
SARS-COV-2 RNA RESP QL NAA+PROBE: NEGATIVE
SODIUM SERPL-SCNC: 137 MMOL/L (ref 136–145)
SP GR UR STRIP.AUTO: 1.02 (ref 1–1.03)
UROBILINOGEN UR QL STRIP.AUTO: 0.2 E.U./DL
WBC # BLD AUTO: 10.14 THOUSAND/UL (ref 4.31–10.16)

## 2020-11-19 PROCEDURE — 81025 URINE PREGNANCY TEST: CPT | Performed by: PHYSICIAN ASSISTANT

## 2020-11-19 PROCEDURE — 93005 ELECTROCARDIOGRAM TRACING: CPT

## 2020-11-19 PROCEDURE — 81003 URINALYSIS AUTO W/O SCOPE: CPT | Performed by: PHYSICIAN ASSISTANT

## 2020-11-19 PROCEDURE — 0241U HB NFCT DS VIR RESP RNA 4 TRGT: CPT | Performed by: PHYSICIAN ASSISTANT

## 2020-11-19 PROCEDURE — 99285 EMERGENCY DEPT VISIT HI MDM: CPT | Performed by: PHYSICIAN ASSISTANT

## 2020-11-19 PROCEDURE — 87491 CHLMYD TRACH DNA AMP PROBE: CPT | Performed by: PHYSICIAN ASSISTANT

## 2020-11-19 PROCEDURE — 83605 ASSAY OF LACTIC ACID: CPT | Performed by: PHYSICIAN ASSISTANT

## 2020-11-19 PROCEDURE — 80053 COMPREHEN METABOLIC PANEL: CPT | Performed by: PHYSICIAN ASSISTANT

## 2020-11-19 PROCEDURE — 36415 COLL VENOUS BLD VENIPUNCTURE: CPT | Performed by: PHYSICIAN ASSISTANT

## 2020-11-19 PROCEDURE — 87591 N.GONORRHOEAE DNA AMP PROB: CPT | Performed by: PHYSICIAN ASSISTANT

## 2020-11-19 PROCEDURE — 99284 EMERGENCY DEPT VISIT MOD MDM: CPT

## 2020-11-19 PROCEDURE — 85025 COMPLETE CBC W/AUTO DIFF WBC: CPT | Performed by: PHYSICIAN ASSISTANT

## 2020-11-19 PROCEDURE — 87651 STREP A DNA AMP PROBE: CPT | Performed by: PHYSICIAN ASSISTANT

## 2020-11-19 PROCEDURE — 71045 X-RAY EXAM CHEST 1 VIEW: CPT

## 2020-11-19 RX ORDER — CARBAMAZEPINE 200 MG/1
200 TABLET ORAL DAILY
COMMUNITY
End: 2021-02-05

## 2020-11-19 RX ORDER — ACETAMINOPHEN 325 MG/1
650 TABLET ORAL ONCE
Status: DISCONTINUED | OUTPATIENT
Start: 2020-11-19 | End: 2020-11-19 | Stop reason: HOSPADM

## 2020-11-20 LAB
ATRIAL RATE: 82 BPM
P AXIS: 54 DEGREES
PR INTERVAL: 136 MS
QRS AXIS: 73 DEGREES
QRSD INTERVAL: 96 MS
QT INTERVAL: 352 MS
QTC INTERVAL: 411 MS
T WAVE AXIS: 2 DEGREES
VENTRICULAR RATE: 82 BPM

## 2020-11-21 LAB
C TRACH DNA SPEC QL NAA+PROBE: NEGATIVE
N GONORRHOEA DNA SPEC QL NAA+PROBE: NEGATIVE

## 2021-02-05 ENCOUNTER — OFFICE VISIT (OUTPATIENT)
Dept: URGENT CARE | Facility: CLINIC | Age: 26
End: 2021-02-05
Payer: COMMERCIAL

## 2021-02-05 VITALS — HEIGHT: 66 IN | OXYGEN SATURATION: 100 % | RESPIRATION RATE: 16 BRPM | WEIGHT: 270 LBS | BODY MASS INDEX: 43.39 KG/M2

## 2021-02-05 DIAGNOSIS — Z20.2 POSSIBLE EXPOSURE TO STD: Primary | ICD-10-CM

## 2021-02-05 LAB
SL AMB  POCT GLUCOSE, UA: NEGATIVE
SL AMB LEUKOCYTE ESTERASE,UA: ABNORMAL
SL AMB POCT BILIRUBIN,UA: NEGATIVE
SL AMB POCT BLOOD,UA: ABNORMAL
SL AMB POCT CLARITY,UA: CLEAR
SL AMB POCT COLOR,UA: YELLOW
SL AMB POCT KETONES,UA: NEGATIVE
SL AMB POCT NITRITE,UA: NEGATIVE
SL AMB POCT PH,UA: 7
SL AMB POCT SPECIFIC GRAVITY,UA: 1.02
SL AMB POCT URINE PROTEIN: NEGATIVE
SL AMB POCT UROBILINOGEN: NEGATIVE

## 2021-02-05 PROCEDURE — 99213 OFFICE O/P EST LOW 20 MIN: CPT | Performed by: EMERGENCY MEDICINE

## 2021-02-05 PROCEDURE — 87086 URINE CULTURE/COLONY COUNT: CPT | Performed by: EMERGENCY MEDICINE

## 2021-02-05 NOTE — PROGRESS NOTES
[facility]        NAME: Nini Abrams is a 23 y o  female  : 1995    MRN: [id number]  DATE: 2021  TIME: 5:10 PM    Assessment and Plan   No primary diagnosis found  No diagnosis found  Patient Instructions     Patient Instructions     Urinary Tract Infection in Women, Ambulatory Care   GENERAL INFORMATION:   A urinary tract infection (UTI)  is caused by bacteria that get inside your urinary tract  Most bacteria that enter your urinary tract are expelled when you urinate  If the bacteria stay in your urinary tract, you may get an infection  Your urinary tract includes your kidneys, ureters, bladder, and urethra  Urine is made in your kidneys, and it flows from the ureters to the bladder  Urine leaves the bladder through the urethra  A UTI is more common in your lower urinary tract, which includes your bladder and urethra  Common symptoms include the following:   · Urinating more often or waking from sleep to urinate    · Pain or burning when you urinate    · Pain or pressure in your lower abdomen     · Urine that smells bad    · Blood in your urine    · Leaking urine  Seek immediate care for the following symptoms:   · Urinating very little or not at all    · Vomiting    · Shaking chills with a fever    · Side or back pain that gets worse  Treatment for a UTI  may include medicines to treat a bacterial infection  You may also need medicines to decrease pain and burning, or decrease the urge to urinate often  Prevent a UTI:   · Urinate when you feel the urge  Do not hold your urine  Urinate as soon as you feel you have to  · Drink liquids as directed  Ask how much liquid to drink each day and which liquids are best for you  You may need to drink more fluids than usual to help flush out the bacteria  Do not drink alcohol, caffeine, and citrus juices  These can irritate your bladder and increase your symptoms      · Apply heat  on your abdomen for 20 to 30 minutes every 2 hours for as many days as directed  Heat helps decrease discomfort and pressure in your bladder  Follow up with your healthcare provider as directed:  Write down your questions so you remember to ask them during your visits  CARE AGREEMENT:   You have the right to help plan your care  Learn about your health condition and how it may be treated  Discuss treatment options with your caregivers to decide what care you want to receive  You always have the right to refuse treatment  The above information is an  only  It is not intended as medical advice for individual conditions or treatments  Talk to your doctor, nurse or pharmacist before following any medical regimen to see if it is safe and effective for you  © 2014 [address] is for End User's use only and may not be sold, redistributed or otherwise used for commercial purposes  All illustrations and images included in CareNotes® are the copyrighted property of [employer]  or Luis Medrano  Follow up with PCP in 3-5 days  Proceed to  ER if symptoms worsen  Chief Complaint   No chief complaint on file  History of Present Illness       Patient complains of abnormal vaginal odor  She claims she had bacterial vaginosis several times during her recent pregnancy  She claims she saw her gynecologist 3 days ago for IUD related problems and told them about this problem  They tested her for bacterial vaginosis and said the test was negative  Gyn recommended patient take a probiotic  She claims they told her to come here for STD testing  Review of Systems   Review of Systems   Constitutional: Negative for chills and fever  HENT: Negative for trouble swallowing and voice change  Respiratory: Negative for chest tightness, shortness of breath and wheezing  Cardiovascular: Negative for chest pain     Genitourinary: Negative for dysuria, flank pain, hematuria, urgency and vaginal discharge  Musculoskeletal: Negative for back pain  Current Medications       Current Outpatient Medications:     albuterol (VENTOLIN HFA) 90 mcg/act inhaler, Inhale 2 puffs every 6 (six) hours as needed for shortness of breath (Patient not taking: Reported on 10/27/2020), Disp: 18 g, Rfl: 0    brompheniramine-pseudoephedrine-DM 30-2-10 MG/5ML syrup, Take 10 mL by mouth 4 (four) times a day as needed for cough (Patient not taking: Reported on 1/4/2020), Disp: 120 mL, Rfl: 0    carBAMazepine (TEGretol) 200 mg tablet, Take 200 mg by mouth daily, Disp: , Rfl:     lamoTRIgine (LaMICtal) 25 mg tablet, , Disp: , Rfl:     lisdexamfetamine (VYVANSE) 50 MG capsule, Take 1 capsule by mouth, Disp: , Rfl:     predniSONE 10 mg tablet, Take once daily all days pills on this schedule 6- 6- 5- 4- 3- 2- 1 (Patient not taking: Reported on 11/17/2019), Disp: 27 tablet, Rfl: 0    valACYclovir (VALTREX) 1,000 mg tablet, Take 500 mg by mouth daily , Disp: , Rfl:     valACYclovir (VALTREX) 500 mg tablet, Take 500 mg by mouth, Disp: , Rfl:     venlafaxine (EFFEXOR-XR) 37 5 mg 24 hr capsule, Take 37 5 mg by mouth, Disp: , Rfl:     VYVANSE 40 MG capsule, , Disp: , Rfl:     Current Allergies     Allergies as of 02/05/2021    (No Known Allergies)            The following portions of the patient's history were reviewed and updated as appropriate: allergies, current medications, past family history, past medical history, past social history, past surgical history and problem list      Past Medical History:   Diagnosis Date    PCOS (polycystic ovarian syndrome)        Past Surgical History:   Procedure Laterality Date    ADENOIDECTOMY      TONSILLECTOMY         Family History   Problem Relation Age of Onset    No Known Problems Mother     No Known Problems Father          Medications have been verified  Objective   There were no vitals taken for this visit         Physical Exam     Physical Exam  Vitals signs and nursing note reviewed  Constitutional:       General: She is not in acute distress  Appearance: She is well-developed  Abdominal:      General: Bowel sounds are normal  There is no distension  Palpations: Abdomen is soft  There is no mass  Tenderness: There is no abdominal tenderness  There is no guarding or rebound  Skin:     General: Skin is warm and dry  Findings: No rash  Neurological:      Mental Status: She is alert and oriented to person, place, and time  Psychiatric:         Mood and Affect: Mood normal          Behavior: Behavior normal          Thought Content:  Thought content normal          Judgment: Judgment normal

## 2021-02-05 NOTE — PATIENT INSTRUCTIONS
Sexually Transmitted Diseases   WHAT YOU NEED TO KNOW:   What is a sexually transmitted disease (STD)? An STD means signs or symptoms of a sexually transmitted infection (STI) have developed  An STI happens when bacteria or a virus are spread through oral, genital, or anal sex  Some examples of STDs are HIV, chlamydia, syphilis, and gonorrhea  What are the signs and symptoms of an STD? You may have one or more of the following, depending on the STD:  · Blisters, warts, sores, or a rash on your skin that may be painful    · Discharge from the penis, vagina, or anus that may have a foul smell    · Fever, muscle pain, or swollen lymph nodes in the groin    · Inflammation and itching    · Pelvic or abdominal pain, or pain during sex or when urinating    · Sore throat, mouth ulcers, or trouble swallowing    · Vaginal bleeding or spotting after sex (women)    What increases my risk for an STD? · Unprotected sex    · Being female    · Alcohol or illegal drug use    · More than 1 sex partner    · Not being vaccinated against certain STIs    · A weak immune system    · Open sores or cuts, such as body piercings    How is an STD diagnosed? Your healthcare provider will examine you and ask about your symptoms  He or she may ask you about your sex history or other medical conditions  He or she will ask if you have had an STD before  · Blood and urine tests  may show an infection and what kind it is  · A sample of discharge  may show what is causing your STD  · A pelvic exam  may be used if you are a woman  A pelvic exam is used to check your vagina, cervix, and other organs  How is an STD treated? Treatment depends on the STD you have  Antibiotics may be given for a bacterial infection  Antivirals may be given for a viral infection  Antifungals may be given for a fungal infection, such as a yeast infection  Early treatment may decrease the risk for certain cancers   Early treatment can also help prevent infertility  How can I help prevent the spread of an STI? Ask your healthcare provider for more information about the following safe sex practices:  · Use a male or female condom during sex  This includes oral, genital, or anal sex  Use a new condom each time  Condoms help prevent pregnancy and STIs  Use latex condoms, if possible  Lambskin (also called sheepskin or natural membrane) condoms do not protect against STIs  A polyurethane condom can be used if you or your partner is allergic to latex  Condoms should be used with a second form of birth control to help prevent pregnancy and STIs  Do not use male and female condoms together  · Do not have sex with someone who has an STI or STD  This includes oral and anal sex  · Limit sex partners  Ask about your partner's sex history before you have sex  · Get screened regularly if you are sexually active  Common tests include chlamydia, gonorrhea, HIV, hepatitis, and syphilis  · Tell your sex partners if you have an STI  Your partners may need to be tested and treated  Do not have sex while you are being treated for an STI  Do not have sex with a partner who is being treated  · Ask about medicines to lower your risk for some STIs:      ? Vaccines  can help protect you from hepatitis A, hepatitis B, and the human papillomavirus (HPV)  The HPV vaccine is usually given at 11 years, but it may be given through 26 years to both females and males  Your provider can give you more information on vaccines to prevent STIs  ? Pre-exposure prophylaxis (PrEP)  may be given if you are at high risk for HIV  PrEP is taken every day to prevent the virus from fully infecting the body  · If you are a woman, do not douche  Douching upsets the normal balance of bacteria found in your vagina  It does not prevent or clear up vaginal infections  When should I seek immediate care? · You have genital swelling or pain, or unusual bleeding      · You have joint pain, a rash, swollen lymph nodes, or night sweats  · You have severe abdominal pain  When should I call my doctor? · You have a fever  · Your symptoms do not go away or get worse, even after treatment  · You have bleeding or pain during sex  · You or your female partner may be pregnant  · You have questions or concerns about your condition or care  CARE AGREEMENT:   You have the right to help plan your care  Learn about your health condition and how it may be treated  Discuss treatment options with your healthcare providers to decide what care you want to receive  You always have the right to refuse treatment  The above information is an  only  It is not intended as medical advice for individual conditions or treatments  Talk to your doctor, nurse or pharmacist before following any medical regimen to see if it is safe and effective for you  © Copyright [address] Information is for End User's use only and may not be sold, redistributed or otherwise used for commercial purposes   All illustrations and images included in CareNotes® are the copyrighted property of A D A M [employer]  or [address] [place] [address]

## 2021-02-06 LAB — BACTERIA UR CULT: NORMAL

## 2021-02-08 ENCOUNTER — TELEPHONE (OUTPATIENT)
Dept: URGENT CARE | Facility: CLINIC | Age: 26
End: 2021-02-08

## 2021-02-08 DIAGNOSIS — R10.9 ABDOMINAL PAIN, UNSPECIFIED ABDOMINAL LOCATION: Primary | ICD-10-CM

## 2021-02-09 LAB
C TRACH DNA SPEC QL NAA+PROBE: ABNORMAL
N GONORRHOEA DNA SPEC QL NAA+PROBE: ABNORMAL

## 2021-02-10 PROCEDURE — 87491 CHLMYD TRACH DNA AMP PROBE: CPT | Performed by: EMERGENCY MEDICINE

## 2021-02-10 PROCEDURE — 87591 N.GONORRHOEAE DNA AMP PROB: CPT | Performed by: EMERGENCY MEDICINE

## 2021-02-12 LAB
C TRACH DNA SPEC QL NAA+PROBE: NEGATIVE
N GONORRHOEA DNA SPEC QL NAA+PROBE: NEGATIVE

## 2021-10-24 ENCOUNTER — OFFICE VISIT (OUTPATIENT)
Dept: URGENT CARE | Facility: CLINIC | Age: 26
End: 2021-10-24

## 2021-10-24 VITALS
HEIGHT: 66 IN | SYSTOLIC BLOOD PRESSURE: 136 MMHG | OXYGEN SATURATION: 98 % | HEART RATE: 91 BPM | WEIGHT: 280 LBS | RESPIRATION RATE: 18 BRPM | BODY MASS INDEX: 45 KG/M2 | DIASTOLIC BLOOD PRESSURE: 84 MMHG | TEMPERATURE: 98.1 F

## 2021-10-24 DIAGNOSIS — S06.0X0A CONCUSSION WITHOUT LOSS OF CONSCIOUSNESS, INITIAL ENCOUNTER: Primary | ICD-10-CM
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
## 2021-10-24 DIAGNOSIS — H72.91 EARDRUM RUPTURE, RIGHT: ICD-10-CM

## 2021-10-24 PROCEDURE — S9083 URGENT CARE CENTER GLOBAL: HCPCS | Performed by: PHYSICIAN ASSISTANT

## 2021-10-24 PROCEDURE — 99213 OFFICE O/P EST LOW 20 MIN: CPT | Performed by: PHYSICIAN ASSISTANT

## 2021-10-24 RX ORDER — OFLOXACIN 3 MG/ML
10 SOLUTION AURICULAR (OTIC) DAILY
Qty: 5 ML | Refills: 0 | Status: SHIPPED | OUTPATIENT
Start: 2021-10-24 | End: 2021-10-31

## 2021-10-24 RX ORDER — VALACYCLOVIR HYDROCHLORIDE 1 G/1
TABLET, FILM COATED ORAL
COMMUNITY
Start: 2021-09-21

## 2021-11-09 ENCOUNTER — TELEPHONE (OUTPATIENT)
Dept: OTOLARYNGOLOGY | Facility: CLINIC | Age: 26
End: 2021-11-09

## 2021-12-07 ENCOUNTER — TELEPHONE (OUTPATIENT)
Dept: OTOLARYNGOLOGY | Facility: CLINIC | Age: 26
End: 2021-12-07

## 2023-12-18 ENCOUNTER — OFFICE VISIT (OUTPATIENT)
Dept: URGENT CARE | Facility: CLINIC | Age: 28
End: 2023-12-18
Payer: COMMERCIAL

## 2023-12-18 VITALS
OXYGEN SATURATION: 97 % | HEART RATE: 77 BPM | WEIGHT: 267.6 LBS | HEIGHT: 66 IN | SYSTOLIC BLOOD PRESSURE: 136 MMHG | DIASTOLIC BLOOD PRESSURE: 71 MMHG | TEMPERATURE: 98.4 F | BODY MASS INDEX: 43.01 KG/M2 | RESPIRATION RATE: 16 BRPM

## 2023-12-18 DIAGNOSIS — J40 BRONCHITIS: Primary | ICD-10-CM

## 2023-12-18 PROCEDURE — S9083 URGENT CARE CENTER GLOBAL: HCPCS

## 2023-12-18 PROCEDURE — G0382 LEV 3 HOSP TYPE B ED VISIT: HCPCS

## 2023-12-18 RX ORDER — ALBUTEROL SULFATE 90 UG/1
2 AEROSOL, METERED RESPIRATORY (INHALATION) EVERY 6 HOURS PRN
Qty: 8.5 G | Refills: 0 | Status: SHIPPED | OUTPATIENT
Start: 2023-12-18

## 2023-12-18 RX ORDER — BENZONATATE 200 MG/1
200 CAPSULE ORAL 3 TIMES DAILY PRN
Qty: 20 CAPSULE | Refills: 0 | Status: SHIPPED | OUTPATIENT
Start: 2023-12-18

## 2023-12-18 RX ORDER — SEMAGLUTIDE 2.4 MG/.75ML
2.4 INJECTION, SOLUTION SUBCUTANEOUS WEEKLY
COMMUNITY
Start: 2023-11-27

## 2023-12-18 RX ORDER — FLUCONAZOLE 150 MG/1
TABLET ORAL
Qty: 2 TABLET | Refills: 0 | Status: SHIPPED | OUTPATIENT
Start: 2023-12-18 | End: 2023-12-21

## 2023-12-18 RX ORDER — AZITHROMYCIN 250 MG/1
TABLET, FILM COATED ORAL
Qty: 6 TABLET | Refills: 0 | Status: SHIPPED | OUTPATIENT
Start: 2023-12-18 | End: 2023-12-22

## 2023-12-19 NOTE — PATIENT INSTRUCTIONS
Fever/Body Aches: OTC Tylenol, ibuprofen, motrin as directed.  Cough: OTC Robitussin or Delsym cough syrup.  Sore Throat: Warm saltwater gargles, honey, drink plenty of liquids, soft foods. If severe, can utilize OTC chloraseptic spray.  Nasal Congestion: OTC saline nasal spray use as directed or OTC flonase, OTC decongestants such as Sudafed as long as no history of high blood pressure . Cool mist humidifier.  Vitamin C and Zinc for immune support.

## 2023-12-19 NOTE — PROGRESS NOTES
"  [facility]        NAME: Courtney Rocha is a 28 y.o. female  : 1995    MRN: [id number]  DATE: 2023  TIME: 7:09 PM    Assessment and Plan   Bronchitis [J40]  1. Bronchitis  azithromycin (ZITHROMAX) 250 mg tablet    benzonatate (TESSALON) 200 MG capsule    albuterol (ProAir HFA) 90 mcg/act inhaler    fluconazole (DIFLUCAN) 150 mg tablet        Discussed problem with patient.  Symptoms consistent with bronchitis.  Prescribing Z-Ga as well as Tessalon Perles and albuterol as needed coughing and wheezing.  Diflucan for yeast infection prophylaxis.  Push fluids.  Advised to start over-the-counter cold flu medication.  Follow-up with PCP if not improving.    Patient Instructions       Follow up with PCP in 3-5 days.  Proceed to  ER if symptoms worsen.    Chief Complaint     Chief Complaint   Patient presents with   • Cold Like Symptoms     Cough with chest congestion, neck pain, headache, and fever starting 2 weeks ago         History of Present Illness       Cough with chest congestion, neck pain, headache, and fever starting 2 weeks ago. Using otc zinc and vitamin c.  Daughter is sick with similar symptoms.  Reports fevers but did not take temperature.  Main complaint is cough and chest tightness.  Past medical history of asthma.  Eating and drinking normally.  Speaking in full sentences without difficulty.      Review of Systems   Review of Systems   Constitutional:  Positive for fatigue. Negative for appetite change, chills and fever (\"felt warm at home\").   HENT:  Positive for congestion, postnasal drip, rhinorrhea, sinus pressure, sinus pain and sore throat. Negative for ear pain.    Respiratory:  Positive for cough, chest tightness and shortness of breath. Negative for wheezing and stridor.    Cardiovascular:  Negative for chest pain and palpitations.   Gastrointestinal:  Negative for abdominal pain, constipation, diarrhea, nausea and vomiting.   Musculoskeletal:  Negative for " "myalgias.   Neurological:  Positive for headaches. Negative for dizziness, syncope and light-headedness.         Current Medications       Current Outpatient Medications:   •  albuterol (ProAir HFA) 90 mcg/act inhaler, Inhale 2 puffs every 6 (six) hours as needed for wheezing, Disp: 8.5 g, Rfl: 0  •  azithromycin (ZITHROMAX) 250 mg tablet, Take 2 tablets today then 1 tablet daily x 4 days, Disp: 6 tablet, Rfl: 0  •  benzonatate (TESSALON) 200 MG capsule, Take 1 capsule (200 mg total) by mouth 3 (three) times a day as needed for cough, Disp: 20 capsule, Rfl: 0  •  fluconazole (DIFLUCAN) 150 mg tablet, Take one pill today, then second pill 72 hours later, Disp: 2 tablet, Rfl: 0  •  Semaglutide-Weight Management (Wegovy) 2.4 MG/0.75ML, Inject 2.4 mg under the skin Once a week, Disp: , Rfl:   •  valACYclovir (VALTREX) 1,000 mg tablet, , Disp: , Rfl:   •  levonorgestrel (MIRENA) 20 MCG/24HR IUD, 1 each by Intrauterine route (Patient not taking: Reported on 12/18/2023), Disp: , Rfl:     Current Allergies     Allergies as of 12/18/2023   • (No Known Allergies)            The following portions of the patient's history were reviewed and updated as appropriate: allergies, current medications, past family history, past medical history, past social history, past surgical history and problem list.     Past Medical History:   Diagnosis Date   • Depression    • PCOS (polycystic ovarian syndrome)        Past Surgical History:   Procedure Laterality Date   • ADENOIDECTOMY     • CYST REMOVAL      hand   • TONSILLECTOMY         Family History   Problem Relation Age of Onset   • No Known Problems Mother    • No Known Problems Father          Medications have been verified.        Objective   /71   Pulse 77   Temp 98.4 °F (36.9 °C)   Resp 16   Ht 5' 6\" (1.676 m)   Wt 121 kg (267 lb 9.6 oz)   LMP 11/14/2023   SpO2 97%   BMI 43.19 kg/m²        Physical Exam     Physical Exam  Vitals and nursing note reviewed. "   Constitutional:       General: She is not in acute distress.     Appearance: Normal appearance. She is normal weight. She is not ill-appearing, toxic-appearing or diaphoretic.   HENT:      Head: Normocephalic.      Right Ear: Tympanic membrane, ear canal and external ear normal. There is no impacted cerumen.      Left Ear: Tympanic membrane, ear canal and external ear normal. There is no impacted cerumen.      Nose: Congestion and rhinorrhea present.      Mouth/Throat:      Mouth: Mucous membranes are moist.      Pharynx: Oropharynx is clear. Posterior oropharyngeal erythema present. No oropharyngeal exudate.   Eyes:      General: No scleral icterus.        Right eye: No discharge.         Left eye: No discharge.      Extraocular Movements: Extraocular movements intact.      Conjunctiva/sclera: Conjunctivae normal.      Pupils: Pupils are equal, round, and reactive to light.   Neck:      Vascular: No carotid bruit.   Cardiovascular:      Rate and Rhythm: Normal rate and regular rhythm.      Pulses: Normal pulses.      Heart sounds: Normal heart sounds. No murmur heard.     No friction rub. No gallop.   Pulmonary:      Effort: Pulmonary effort is normal. No respiratory distress.      Breath sounds: Normal breath sounds. No stridor. No wheezing, rhonchi or rales.   Chest:      Chest wall: No tenderness.   Musculoskeletal:      Cervical back: Normal range of motion and neck supple. No rigidity or tenderness.   Lymphadenopathy:      Cervical: No cervical adenopathy.   Neurological:      Mental Status: She is alert.

## 2023-12-28 ENCOUNTER — HOSPITAL ENCOUNTER (EMERGENCY)
Facility: HOSPITAL | Age: 28
Discharge: HOME | End: 2023-12-28
Attending: EMERGENCY MEDICINE
Payer: COMMERCIAL

## 2023-12-28 ENCOUNTER — APPOINTMENT (EMERGENCY)
Dept: RADIOLOGY | Facility: HOSPITAL | Age: 28
End: 2023-12-28
Payer: COMMERCIAL

## 2023-12-28 VITALS
BODY MASS INDEX: 43.39 KG/M2 | DIASTOLIC BLOOD PRESSURE: 72 MMHG | HEIGHT: 66 IN | OXYGEN SATURATION: 98 % | WEIGHT: 270 LBS | SYSTOLIC BLOOD PRESSURE: 113 MMHG | TEMPERATURE: 97.9 F | RESPIRATION RATE: 16 BRPM | HEART RATE: 83 BPM

## 2023-12-28 DIAGNOSIS — J06.9 URI (UPPER RESPIRATORY INFECTION): Primary | ICD-10-CM

## 2023-12-28 LAB
FLUAV RNA RESP QL NAA+PROBE: NEGATIVE
FLUBV RNA RESP QL NAA+PROBE: NEGATIVE
RSV RNA RESP QL NAA+PROBE: NEGATIVE
SARS-COV-2 RNA RESP QL NAA+PROBE: POSITIVE

## 2023-12-28 PROCEDURE — 0241U HB NFCT DS VIR RESP RNA 4 TRGT: CPT | Performed by: PHYSICIAN ASSISTANT

## 2023-12-28 PROCEDURE — 99284 EMERGENCY DEPT VISIT MOD MDM: CPT | Performed by: PHYSICIAN ASSISTANT

## 2023-12-28 PROCEDURE — 71046 X-RAY EXAM CHEST 2 VIEWS: CPT

## 2023-12-28 RX ORDER — FLUCONAZOLE 150 MG/1
150 TABLET ORAL ONCE
Qty: 2 TABLET | Refills: 0 | Status: SHIPPED | OUTPATIENT
Start: 2023-12-28 | End: 2023-12-28

## 2023-12-28 RX ORDER — AMOXICILLIN AND CLAVULANATE POTASSIUM 875; 125 MG/1; MG/1
1 TABLET, FILM COATED ORAL EVERY 12 HOURS
Qty: 14 TABLET | Refills: 0 | Status: SHIPPED | OUTPATIENT
Start: 2023-12-28 | End: 2024-01-04

## 2023-12-28 NOTE — DISCHARGE INSTRUCTIONS
Recommend waiting on antibiotic to see if symptoms improve.  If they do over the next several days you do not need to give the antibiotic however if symptoms are worsening the antibiotic will be sent to your pharmacy.  Please follow-up with the pediatrician and return with any new or worsening symptoms  Your x-ray was reviewed today in the emergency department and there was no obvious abnormalities found, however, the imaging will be reviewed by the radiologist later this evening or the following day and if there is any abnormalities found you will get a phone call with those results.

## 2023-12-28 NOTE — Clinical Note
Courtney Rocha was seen and treated in our emergency department on 12/28/2023.                Diagnosis:     Courtney  may return to work on return date.    She may return on this date: 01/01/2024    Both Courtney and Aziza were seen in the ED today. Please Courtney until 1/3/23 dur to COVID-19 infection      If you have any questions or concerns, please don't hesitate to call.      Liane Martínez PA-C    ______________________________           _______________          _______________  Hospital Representative                              Date                                Time

## 2023-12-28 NOTE — Clinical Note
Courtney Rocha was seen and treated in our emergency department on 12/28/2023.                Diagnosis:     Courtney  may return to work on return date.    She may return on this date: 12/29/2023    Both Courtney and Aziza were seen in the ED today. Please excuse until 12/29/23     If you have any questions or concerns, please don't hesitate to call.      Liane Martínez PA-C    ______________________________           _______________          _______________  Hospital Representative                              Date                                Time

## 2023-12-28 NOTE — ED PROVIDER NOTES
"History  Chief Complaint   Patient presents with    Cough     Patient states that she has had a cough and congestion for the past two weeks.     28-year-old female presented to the emergency department for evaluation of cough for the past 2 weeks.  Patient states she had congestion however states this actually has improved but continues with a productive cough.  Spitting up clear to green phlegm.  She denies fevers or chills.  Denies any significant ear pain or drainage.  No sore throat.  No rashes.  No nausea vomiting or diarrhea.  Normal appetite.  States she took a Z-Ga without improvement of symptoms.  Reports \"everyone in the family is sick.\" She denies chest pain, SOB, abdominal pain.         Prior to Admission Medications   Prescriptions Last Dose Informant Patient Reported? Taking?   Semaglutide-Weight Management (Wegovy) 2.4 MG/0.75ML   Yes No   Sig: Inject 2.4 mg under the skin Once a week   albuterol (ProAir HFA) 90 mcg/act inhaler   No No   Sig: Inhale 2 puffs every 6 (six) hours as needed for wheezing   benzonatate (TESSALON) 200 MG capsule   No No   Sig: Take 1 capsule (200 mg total) by mouth 3 (three) times a day as needed for cough   levonorgestrel (MIRENA) 20 MCG/24HR IUD   Yes No   Si each by Intrauterine route   Patient not taking: Reported on 2023   valACYclovir (VALTREX) 1,000 mg tablet   Yes No      Facility-Administered Medications: None       Past Medical History:   Diagnosis Date    Depression     PCOS (polycystic ovarian syndrome)        Past Surgical History:   Procedure Laterality Date    ADENOIDECTOMY      CYST REMOVAL      hand    TONSILLECTOMY         Family History   Problem Relation Age of Onset    No Known Problems Mother     No Known Problems Father      I have reviewed and agree with the history as documented.    E-Cigarette/Vaping    E-Cigarette Use Current Every Day User      E-Cigarette/Vaping Substances    Nicotine Yes     THC No     CBD No     Flavoring Yes  "     Social History     Tobacco Use    Smoking status: Former     Current packs/day: 0.25     Types: Cigarettes    Smokeless tobacco: Never   Vaping Use    Vaping status: Every Day    Substances: Nicotine, Flavoring   Substance Use Topics    Alcohol use: Yes    Drug use: Never       Review of Systems   Constitutional:  Positive for fatigue. Negative for appetite change and fever.   HENT:  Positive for congestion. Negative for rhinorrhea, sinus pressure and sinus pain.    Respiratory:  Positive for cough. Negative for choking, shortness of breath, wheezing and stridor.    Cardiovascular: Negative.    Gastrointestinal:  Negative for abdominal pain, diarrhea, nausea and vomiting.   Genitourinary: Negative.    Musculoskeletal: Negative.    Skin: Negative.    Neurological: Negative.    All other systems reviewed and are negative.      Physical Exam  Physical Exam  Vitals and nursing note reviewed.   Constitutional:       General: She is not in acute distress.     Appearance: Normal appearance. She is not ill-appearing, toxic-appearing or diaphoretic.   HENT:      Head: Normocephalic and atraumatic.      Right Ear: Tympanic membrane, ear canal and external ear normal.      Left Ear: Tympanic membrane, ear canal and external ear normal.      Nose: Nose normal.      Mouth/Throat:      Mouth: Mucous membranes are moist.      Pharynx: No oropharyngeal exudate or posterior oropharyngeal erythema.   Eyes:      Conjunctiva/sclera: Conjunctivae normal.      Pupils: Pupils are equal, round, and reactive to light.   Cardiovascular:      Rate and Rhythm: Normal rate and regular rhythm.   Pulmonary:      Effort: Pulmonary effort is normal.      Breath sounds: Normal breath sounds. No stridor. No wheezing, rhonchi or rales.   Chest:      Chest wall: No tenderness.   Musculoskeletal:      Cervical back: Normal range of motion.   Skin:     General: Skin is warm and dry.      Findings: No rash.   Neurological:      General: No focal  deficit present.      Mental Status: She is alert.         Vital Signs  ED Triage Vitals [12/28/23 0910]   Temperature Pulse Respirations Blood Pressure SpO2   97.9 °F (36.6 °C) 83 16 113/72 98 %      Temp Source Heart Rate Source Patient Position - Orthostatic VS BP Location FiO2 (%)   Temporal Monitor Sitting Left arm --      Pain Score       No Pain           Vitals:    12/28/23 0910   BP: 113/72   Pulse: 83   Patient Position - Orthostatic VS: Sitting         Visual Acuity      ED Medications  Medications - No data to display    Diagnostic Studies  Results Reviewed       Procedure Component Value Units Date/Time    FLU/RSV/COVID - if FLU/RSV clinically relevant [[id number]]  (Abnormal) Collected: 12/28/23 0943    Lab Status: Final result Specimen: Nares from Nose Updated: 12/28/23 1030     SARS-CoV-2 Positive     INFLUENZA A PCR Negative     INFLUENZA B PCR Negative     RSV PCR Negative    Narrative:      FOR PEDIATRIC PATIENTS - copy/paste COVID Guidelines URL to browser: [email or web address]    SARS-CoV-2 assay is a Nucleic Acid Amplification assay intended for the  qualitative detection of nucleic acid from SARS-CoV-2 in nasopharyngeal  swabs. Results are for the presumptive identification of SARS-CoV-2 RNA.    Positive results are indicative of infection with SARS-CoV-2, the virus  causing COVID-19, but do not rule out bacterial infection or co-infection  with other viruses. Laboratories within the [place] and its  territories are required to report all positive results to the appropriate  public health authorities. Negative results do not preclude SARS-CoV-2  infection and should not be used as the sole basis for treatment or other  patient management decisions. Negative results must be combined with  clinical observations, patient history, and epidemiological information.  This test has not been FDA cleared or approved.    This test has been  authorized by FDA under an Emergency Use Authorization  (EUA). This test is only authorized for the duration of time the  declaration that circumstances exist justifying the authorization of the  emergency use of an in vitro diagnostic tests for detection of SARS-CoV-2  virus and/or diagnosis of COVID-19 infection under section 564(b)(1) of  the Act, 21 U.S.C. 360bbb-3(b)(1), unless the authorization is terminated  or revoked sooner. The test has been validated but independent review by FDA  and CLIA is pending.    Test performed using [employer] GeneXpert: This RT-PCR assay targets N2,  a region unique to SARS-CoV-2. A conserved region in the E-gene was chosen  for pan-Sarbecovirus detection which includes SARS-CoV-2.    According to CMS-2020-01-R, this platform meets the definition of high-throughput technology.                   XR chest 2 views   ED Interpretation by Danyel Franco MD (12/28 0934)   NAD                 Procedures  Procedures         ED Course                                             Medical Decision Making  28-year-old female presented to the emergency department with URI symptoms for the past 2 weeks.  No improvement on previously prescribed zpack.  Vitals and medical record reviewed.  Patient at risk for the following but not limited to COVID, flu, RSV, pneumonia, URI.  Physical exam is unremarkable.  ED interpretation of chest x-ray is negative for acute cardiopulmonary findings.  Patient was discharged home with a wait-and-see antibiotic.  However after discharge her viral panel did return positive for COVID.  I called and discussed this with the her.  We discussed symptomatic treatment at home.  I did recommend not picking up the antibiotic as this is unlikely to help as COVID is a viral infection and she verbalized understanding.  Patient was clinically and hemodynamically stable for discharge.    Problems Addressed:  URI (upper respiratory infection): acute illness or injury    Amount  and/or Complexity of Data Reviewed  Radiology: ordered and independent interpretation performed.    Risk  Prescription drug management.             Disposition  Final diagnoses:   URI (upper respiratory infection)     Time reflects when diagnosis was documented in both MDM as applicable and the Disposition within this note       Time User Action Codes Description Comment    12/28/2023  9:57 AM KseniaLiane clemente Add [J06.9] URI (upper respiratory infection)           ED Disposition       ED Disposition   Discharge    Condition   Stable    Date/Time   Thu Dec 28, 2023  9:56 AM    Comment   Courtney Rocah discharge to home/self care.                   Follow-up Information       Follow up With Specialties Details Why Contact Info    Martínez Ragsdale IV, MD Family Medicine   [address] [place] [address]  [phone]              Discharge Medication List as of 12/28/2023  9:58 AM        START taking these medications    Details   amoxicillin-clavulanate (AUGMENTIN) 875-125 mg per tablet Take 1 tablet by mouth every 12 (twelve) hours for 7 days, Starting Thu 12/28/2023, Until Thu 1/4/2024, Normal           CONTINUE these medications which have NOT CHANGED    Details   albuterol (ProAir HFA) 90 mcg/act inhaler Inhale 2 puffs every 6 (six) hours as needed for wheezing, Starting Mon 12/18/2023, Normal      benzonatate (TESSALON) 200 MG capsule Take 1 capsule (200 mg total) by mouth 3 (three) times a day as needed for cough, Starting Mon 12/18/2023, Normal      levonorgestrel (MIRENA) 20 MCG/24HR IUD 1 each by Intrauterine route, Starting Fri 10/9/2020, Until Thu 10/9/2025 at 2359, Historical Med      Semaglutide-Weight Management (Wegovy) 2.4 MG/0.75ML Inject 2.4 mg under the skin Once a week, Starting Mon 11/27/2023, Historical Med      valACYclovir (VALTREX) 1,000 mg tablet Starting Tue 9/21/2021, Historical Med             No discharge procedures on file.    PDMP Review       None            ED  Provider  Electronically Signed by             Liane Martínez PA-C  12/28/23 1041

## 2023-12-28 NOTE — Clinical Note
Courtney Rocha was seen and treated in our emergency department on 12/28/2023.                Diagnosis:     Courtney  may return to work on return date.    She may return on this date: 01/01/2024    Both Courtney and Aziza were seen in the ED today. Please excuse until 1/1/24     If you have any questions or concerns, please don't hesitate to call.      Liane Martínez PA-C    ______________________________           _______________          _______________  Hospital Representative                              Date                                Time

## 2024-05-13 ENCOUNTER — OFFICE VISIT (OUTPATIENT)
Dept: URGENT CARE | Facility: CLINIC | Age: 29
End: 2024-05-13
Payer: COMMERCIAL

## 2024-05-13 VITALS
WEIGHT: 272.2 LBS | OXYGEN SATURATION: 99 % | DIASTOLIC BLOOD PRESSURE: 102 MMHG | HEIGHT: 66 IN | TEMPERATURE: 98.7 F | SYSTOLIC BLOOD PRESSURE: 137 MMHG | RESPIRATION RATE: 18 BRPM | HEART RATE: 80 BPM | BODY MASS INDEX: 43.75 KG/M2

## 2024-05-13 DIAGNOSIS — J02.9 ACUTE PHARYNGITIS, UNSPECIFIED ETIOLOGY: ICD-10-CM

## 2024-05-13 DIAGNOSIS — J06.9 VIRAL URI: Primary | ICD-10-CM

## 2024-05-13 DIAGNOSIS — T36.95XA ANTIBIOTIC-INDUCED YEAST INFECTION: ICD-10-CM

## 2024-05-13 DIAGNOSIS — B37.9 ANTIBIOTIC-INDUCED YEAST INFECTION: ICD-10-CM

## 2024-05-13 PROCEDURE — G0382 LEV 3 HOSP TYPE B ED VISIT: HCPCS | Performed by: PHYSICIAN ASSISTANT

## 2024-05-13 PROCEDURE — S9083 URGENT CARE CENTER GLOBAL: HCPCS | Performed by: PHYSICIAN ASSISTANT

## 2024-05-13 RX ORDER — FLUCONAZOLE 150 MG/1
TABLET ORAL
Qty: 2 TABLET | Refills: 1 | Status: SHIPPED | OUTPATIENT
Start: 2024-05-13 | End: 2024-05-16

## 2024-05-13 RX ORDER — PREDNISONE 10 MG/1
10 TABLET ORAL DAILY
Qty: 21 TABLET | Refills: 0 | Status: SHIPPED | OUTPATIENT
Start: 2024-05-13

## 2024-05-13 RX ORDER — AMOXICILLIN 500 MG/1
500 CAPSULE ORAL EVERY 12 HOURS SCHEDULED
Qty: 14 CAPSULE | Refills: 0 | Status: SHIPPED | OUTPATIENT
Start: 2024-05-13 | End: 2024-05-20

## 2024-05-13 NOTE — PROGRESS NOTES
[facility]        NAME: Courtney Rocha is a 28 y.o. female  : 1995    MRN: [id number]  DATE: May 13, 2024  TIME: 5:26 PM    Assessment and Plan   Viral URI [J06.9]  1. Viral URI        2. Acute pharyngitis, unspecified etiology  amoxicillin (AMOXIL) 500 mg capsule    predniSONE 10 mg tablet      3. Antibiotic-induced yeast infection  fluconazole (DIFLUCAN) 150 mg tablet            Patient Instructions   Over-the-counter cold and flu medications as needed for symptoms.  Drink plenty of fluids.  Follow-up with the PCP in 3 to 5 days if her symptoms do not improve.  Go to ER if symptoms become severe.    Over-the-counter cold medication 101:  -Guanfacine (ex. Mucinex) is a mucus thinner.  Good for sinus or chest congestion.  Works best if you drink plenty of fluids.  -Dextromethorphan (ex. Delsym, Mucinex DM) as a cough suppressant  -Pseudoephedrine (ex. Sudaphed) is a decongestion and should not be used by those with high blood pressure or heart problems.  -Flonase is a intranasal steroid.  Good for sinus congestion and postnasal drip.  -Antihistamines (Claritin, Zyrtec, Allegra Benadryl) are used for allergies as well as colds for treatment of congestion and ear fullness.      Follow up with PCP in 3-5 days.  Proceed to  ER if symptoms worsen.    If tests have been performed at [facility], our office will contact you with results if changes need to be made to the care plan discussed with you at the visit.  [facility]    Chief Complaint     Chief Complaint   Patient presents with    Cold Like Symptoms     Sore throat and cough x 1 week         History of Present Illness       Patient is a 28-year-old female with significant past medical history of PCOS presents the office complaining of sore throat and cough for 1 week.    Sore Throat   This is a new problem. The current episode started in the past 7 days. The problem has been unchanged. There has been no  fever. Associated symptoms include coughing and headaches. Pertinent negatives include no abdominal pain, congestion, diarrhea, shortness of breath or vomiting. She has had exposure to strep (daughter had strep not type A).       Review of Systems   Review of Systems   Constitutional:  Negative for fever.   HENT:  Positive for postnasal drip and sore throat. Negative for congestion.    Respiratory:  Positive for cough. Negative for shortness of breath.    Cardiovascular:  Negative for chest pain.   Gastrointestinal:  Negative for abdominal pain, diarrhea, nausea and vomiting.   Skin:  Negative for rash.   Neurological:  Positive for headaches.         Current Medications       Current Outpatient Medications:     amoxicillin (AMOXIL) 500 mg capsule, Take 1 capsule (500 mg total) by mouth every 12 (twelve) hours for 7 days, Disp: 14 capsule, Rfl: 0    fluconazole (DIFLUCAN) 150 mg tablet, Take 1 tab on day 1.  Take 1 tab 72 hours later., Disp: 2 tablet, Rfl: 1    predniSONE 10 mg tablet, Take 1 tablet (10 mg total) by mouth daily Take 6 on day 1, take 5 on day 2, take 4 on day 3, take 3 on day 4, take 2 on day 5, take 1 on day 6., Disp: 21 tablet, Rfl: 0    Semaglutide-Weight Management (Wegovy) 2.4 MG/0.75ML, Inject 2.4 mg under the skin Once a week, Disp: , Rfl:     valACYclovir (VALTREX) 1,000 mg tablet, , Disp: , Rfl:     Current Allergies     Allergies as of 05/13/2024    (No Known Allergies)            The following portions of the patient's history were reviewed and updated as appropriate: allergies, current medications, past family history, past medical history, past social history, past surgical history and problem list.     Past Medical History:   Diagnosis Date    Depression     PCOS (polycystic ovarian syndrome)        Past Surgical History:   Procedure Laterality Date    ADENOIDECTOMY      CYST REMOVAL      hand    TONSILLECTOMY         Family History   Problem Relation Age of Onset    No Known Problems  "Mother     No Known Problems Father          Medications have been verified.        Objective   BP (!) 137/102   Pulse 80   Temp 98.7 °F (37.1 °C) (Temporal)   Resp 18   Ht 5' 6\" (1.676 m)   Wt 123 kg (272 lb 3.2 oz)   SpO2 99%   BMI 43.93 kg/m²   No LMP recorded.       Physical Exam     Physical Exam  Vitals and nursing note reviewed.   Constitutional:       Appearance: Normal appearance. She is well-developed.   HENT:      Head: Normocephalic and atraumatic.      Right Ear: Tympanic membrane, ear canal and external ear normal.      Left Ear: Tympanic membrane, ear canal and external ear normal.      Nose: Nose normal.      Mouth/Throat:      Mouth: Mucous membranes are moist.      Pharynx: Uvula midline. No pharyngeal swelling or posterior oropharyngeal erythema.      Tonsils: No tonsillar exudate or tonsillar abscesses.   Eyes:      General: Lids are normal.      Conjunctiva/sclera: Conjunctivae normal.      Pupils: Pupils are equal, round, and reactive to light.   Cardiovascular:      Rate and Rhythm: Normal rate and regular rhythm.      Pulses: Normal pulses.      Heart sounds: Normal heart sounds. No murmur heard.     No friction rub. No gallop.   Pulmonary:      Effort: Pulmonary effort is normal.      Breath sounds: Normal breath sounds. No wheezing, rhonchi or rales.   Musculoskeletal:         General: Normal range of motion.      Cervical back: Neck supple.   Lymphadenopathy:      Cervical: No cervical adenopathy.   Skin:     General: Skin is warm and dry.      Capillary Refill: Capillary refill takes less than 2 seconds.   Neurological:      Mental Status: She is alert.                   "

## 2024-06-17 ENCOUNTER — OFFICE VISIT (OUTPATIENT)
Dept: URGENT CARE | Facility: CLINIC | Age: 29
End: 2024-06-17
Payer: COMMERCIAL

## 2024-06-17 VITALS
SYSTOLIC BLOOD PRESSURE: 122 MMHG | DIASTOLIC BLOOD PRESSURE: 68 MMHG | BODY MASS INDEX: 43.55 KG/M2 | RESPIRATION RATE: 18 BRPM | OXYGEN SATURATION: 96 % | TEMPERATURE: 98.9 F | HEART RATE: 74 BPM | HEIGHT: 66 IN | WEIGHT: 271 LBS

## 2024-06-17 DIAGNOSIS — L20.9 ATOPIC DERMATITIS, UNSPECIFIED TYPE: Primary | ICD-10-CM

## 2024-06-17 PROCEDURE — S9083 URGENT CARE CENTER GLOBAL: HCPCS | Performed by: STUDENT IN AN ORGANIZED HEALTH CARE EDUCATION/TRAINING PROGRAM

## 2024-06-17 PROCEDURE — 99213 OFFICE O/P EST LOW 20 MIN: CPT | Performed by: STUDENT IN AN ORGANIZED HEALTH CARE EDUCATION/TRAINING PROGRAM

## 2024-06-17 RX ORDER — FLUCONAZOLE 150 MG/1
150 TABLET ORAL DAILY
Qty: 2 TABLET | Refills: 1 | Status: SHIPPED | OUTPATIENT
Start: 2024-06-17 | End: 2024-06-19

## 2024-06-17 RX ORDER — FLUCONAZOLE 150 MG/1
150 TABLET ORAL DAILY
Qty: 2 TABLET | Refills: 0 | Status: SHIPPED | OUTPATIENT
Start: 2024-06-17 | End: 2024-06-17

## 2024-06-17 RX ORDER — PREDNISONE 20 MG/1
40 TABLET ORAL DAILY
Qty: 10 TABLET | Refills: 0 | Status: SHIPPED | OUTPATIENT
Start: 2024-06-17 | End: 2024-06-22

## 2024-06-17 NOTE — PROGRESS NOTES
[facility]        NAME: Courtney Rocha is a 28 y.o. female  : 1995    MRN: [id number]  DATE: 2024  TIME: 5:21 PM    Assessment and Plan   Atopic dermatitis, unspecified type [L20.9]  1. Atopic dermatitis, unspecified type  predniSONE 20 mg tablet    fluconazole (DIFLUCAN) 150 mg tablet    DISCONTINUED: fluconazole (DIFLUCAN) 150 mg tablet          Will tx with prednisone burst. Continue with benadryl.   Patient Instructions       Follow up with PCP in 3-5 days.  Proceed to  ER if symptoms worsen.    If tests have been performed at [facility], our office will contact you with results if changes need to be made to the care plan discussed with you at the visit.  You can [facility]    Chief Complaint     Chief Complaint   Patient presents with    Rash     Rash started last week after getting her nails done          History of Present Illness       Rash  This is a new problem. The current episode started in the past 7 days. The affected locations include the chest, left lower leg, left upper leg, right lower leg and right upper leg. The rash is characterized by redness and itchiness. Associated with: chemical in nails done at [facility]. Associated symptoms include itching. Pertinent negatives include no cough, diarrhea, facial edema, fever, shortness of breath or sore throat. Past treatments include antihistamine. The treatment provided mild relief. Her past medical history is significant for allergies. There were no sick contacts.       Review of Systems   Review of Systems   Constitutional:  Negative for fever.   HENT:  Negative for sore throat.    Respiratory:  Negative for cough and shortness of breath.    Gastrointestinal:  Negative for diarrhea.   Skin:  Positive for itching and rash.     As stated in HPI    Current Medications       Current Outpatient Medications:     fluconazole (DIFLUCAN) 150 mg tablet, Take 1 tablet (150 mg total) by mouth in the morning  "for 2 doses, Disp: 2 tablet, Rfl: 1    predniSONE 20 mg tablet, Take 2 tablets (40 mg total) by mouth daily for 5 days, Disp: 10 tablet, Rfl: 0    Semaglutide-Weight Management (Wegovy) 2.4 MG/0.75ML, Inject 2.4 mg under the skin Once a week, Disp: , Rfl:     valACYclovir (VALTREX) 1,000 mg tablet, , Disp: , Rfl:     Current Allergies     Allergies as of 06/17/2024    (No Known Allergies)            The following portions of the patient's history were reviewed and updated as appropriate: allergies, current medications, past family history, past medical history, past social history, past surgical history and problem list.     Past Medical History:   Diagnosis Date    Depression     PCOS (polycystic ovarian syndrome)        Past Surgical History:   Procedure Laterality Date    ADENOIDECTOMY      CYST REMOVAL      hand    TONSILLECTOMY         Family History   Problem Relation Age of Onset    No Known Problems Mother     No Known Problems Father          Medications have been verified.        Objective   /68   Pulse 74   Temp 98.9 °F (37.2 °C)   Resp 18   Ht 5' 6\" (1.676 m)   Wt 123 kg (271 lb)   LMP 05/27/2024 (Exact Date)   SpO2 96%   BMI 43.74 kg/m²   Patient's last menstrual period was 05/27/2024 (exact date).       Physical Exam     Physical Exam  Constitutional:       General: She is not in acute distress.     Appearance: She is well-developed.   HENT:      Head: Normocephalic and atraumatic.      Right Ear: External ear normal.      Left Ear: External ear normal.   Cardiovascular:      Rate and Rhythm: Normal rate.   Pulmonary:      Effort: Pulmonary effort is normal.   Skin:     Comments: Erythematous, urticarial rash on chest, b/ LE's   Neurological:      General: No focal deficit present.      Mental Status: She is alert and oriented to person, place, and time.   Psychiatric:         Speech: Speech normal.         Behavior: Behavior normal.                   "

## 2024-08-01 ENCOUNTER — OFFICE VISIT (OUTPATIENT)
Dept: URGENT CARE | Facility: CLINIC | Age: 29
End: 2024-08-01
Payer: COMMERCIAL

## 2024-08-01 VITALS
DIASTOLIC BLOOD PRESSURE: 72 MMHG | TEMPERATURE: 98 F | SYSTOLIC BLOOD PRESSURE: 123 MMHG | HEART RATE: 72 BPM | RESPIRATION RATE: 18 BRPM | WEIGHT: 274.2 LBS | BODY MASS INDEX: 43.03 KG/M2 | OXYGEN SATURATION: 98 % | HEIGHT: 67 IN

## 2024-08-01 DIAGNOSIS — Z11.3 SCREENING FOR STDS (SEXUALLY TRANSMITTED DISEASES): ICD-10-CM

## 2024-08-01 DIAGNOSIS — J02.9 VIRAL PHARYNGITIS: Primary | ICD-10-CM

## 2024-08-01 DIAGNOSIS — R35.0 URINARY FREQUENCY: ICD-10-CM

## 2024-08-01 LAB
S PYO AG THROAT QL: NEGATIVE
SL AMB  POCT GLUCOSE, UA: NEGATIVE
SL AMB LEUKOCYTE ESTERASE,UA: NEGATIVE
SL AMB POCT BILIRUBIN,UA: NEGATIVE
SL AMB POCT BLOOD,UA: ABNORMAL
SL AMB POCT CLARITY,UA: CLEAR
SL AMB POCT COLOR,UA: YELLOW
SL AMB POCT KETONES,UA: NEGATIVE
SL AMB POCT NITRITE,UA: NEGATIVE
SL AMB POCT PH,UA: 7.5
SL AMB POCT SPECIFIC GRAVITY,UA: 1.01
SL AMB POCT URINE HCG: NEGATIVE
SL AMB POCT URINE PROTEIN: ABNORMAL
SL AMB POCT UROBILINOGEN: ABNORMAL

## 2024-08-01 PROCEDURE — S9083 URGENT CARE CENTER GLOBAL: HCPCS | Performed by: PHYSICIAN ASSISTANT

## 2024-08-01 PROCEDURE — 81002 URINALYSIS NONAUTO W/O SCOPE: CPT | Performed by: PHYSICIAN ASSISTANT

## 2024-08-01 PROCEDURE — 81025 URINE PREGNANCY TEST: CPT | Performed by: PHYSICIAN ASSISTANT

## 2024-08-01 PROCEDURE — 87880 STREP A ASSAY W/OPTIC: CPT | Performed by: PHYSICIAN ASSISTANT

## 2024-08-01 PROCEDURE — 99213 OFFICE O/P EST LOW 20 MIN: CPT | Performed by: PHYSICIAN ASSISTANT

## 2024-08-01 PROCEDURE — 87491 CHLMYD TRACH DNA AMP PROBE: CPT | Performed by: PHYSICIAN ASSISTANT

## 2024-08-01 PROCEDURE — 87591 N.GONORRHOEAE DNA AMP PROB: CPT | Performed by: PHYSICIAN ASSISTANT

## 2024-08-01 NOTE — PROGRESS NOTES
[facility]        NAME: Courtney Rocha is a 29 y.o. female  : 1995    MRN: [id number]  DATE: 2024  TIME: 6:12 PM    Assessment and Plan   Viral pharyngitis [J02.9]  1. Viral pharyngitis  POCT rapid strepA      2. Urinary frequency  POCT urine dip    POCT urine HCG      3. Screening for STDs (sexually transmitted diseases)  Chlamydia/GC amplified DNA by PCR    Chlamydia/GC amplified DNA by PCR            Patient Instructions   Over-the-counter cold and flu medications as needed for symptoms.  Drink plenty of fluids.  Follow-up with the PCP in 3 to 5 days if her symptoms do not improve.  Go to ER if symptoms become severe.    Over-the-counter cold medication 101:  -Guanfacine (ex. Mucinex) is a mucus thinner.  Good for sinus or chest congestion.  Works best if you drink plenty of fluids.  -Dextromethorphan (ex. Delsym, Mucinex DM) as a cough suppressant  -Pseudoephedrine (ex. Sudaphed) is a decongestion and should not be used by those with high blood pressure or heart problems.  -Flonase is a intranasal steroid.  Good for sinus congestion and postnasal drip.  -Antihistamines (Claritin, Zyrtec, Allegra Benadryl) are used for allergies as well as colds for treatment of congestion and ear fullness.      Follow up with PCP in 3-5 days.  Proceed to  ER if symptoms worsen.    If tests have been performed at [facility], our office will contact you with results if changes need to be made to the care plan discussed with you at the visit.  [facility]    Chief Complaint     Chief Complaint   Patient presents with    Possible UTI     Frequent urination starting Monday/Tuesday.    Cold Like Symptoms     Sore throat and body aches starting yesterday morning and worsening today. Reporting slight SOB, wheezing, and tightness in chest starting today.          History of Present Illness       Patient a 29-year-old female with significant past medical history of PCOS  presents the office complaining of bodyaches and sore throat since yesterday.  States she feels some chest tightness.    She also reports having urinary frequency for 3 to 4 days.  States she has been drinking more water but thought she should be tested for possible UTI while she is at the office for her sore throat.  Requesting testing for STDs also.  Denies vaginal discharge, vaginal itching, vaginal lesions, or odors.    Urinary Tract Infection   This is a new problem. The current episode started in the past 7 days. The problem has been unchanged. There has been no fever. She is Sexually active (Requesting STD testing today). Associated symptoms include frequency and nausea. Pertinent negatives include no discharge, flank pain, hematuria, hesitancy, urgency or vomiting.   Sore Throat   This is a new problem. The current episode started yesterday. The problem has been gradually worsening. There has been no fever. Associated symptoms include shortness of breath. Pertinent negatives include no abdominal pain, congestion, coughing, diarrhea, headaches or vomiting.       Review of Systems   Review of Systems   Constitutional:  Negative for fever.   HENT:  Positive for sore throat. Negative for congestion.    Respiratory:  Positive for chest tightness and shortness of breath. Negative for cough.    Cardiovascular:  Negative for chest pain and palpitations.   Gastrointestinal:  Positive for nausea. Negative for abdominal pain, diarrhea and vomiting.   Genitourinary:  Positive for frequency. Negative for decreased urine volume, difficulty urinating, dysuria, enuresis, flank pain, hematuria, hesitancy, pelvic pain, urgency, vaginal bleeding, vaginal discharge and vaginal pain.   Musculoskeletal:  Positive for myalgias.   Skin:  Negative for rash.   Neurological:  Negative for dizziness, light-headedness and headaches.         Current Medications       Current Outpatient Medications:     metFORMIN (GLUCOPHAGE) 500 mg  "tablet, Take 500 mg by mouth, Disp: , Rfl:     Semaglutide-Weight Management (Wegovy) 2.4 MG/0.75ML, Inject 2.4 mg under the skin Once a week, Disp: , Rfl:     valACYclovir (VALTREX) 1,000 mg tablet, , Disp: , Rfl:     Current Allergies     Allergies as of 08/01/2024    (No Known Allergies)            The following portions of the patient's history were reviewed and updated as appropriate: allergies, current medications, past family history, past medical history, past social history, past surgical history and problem list.     Past Medical History:   Diagnosis Date    Depression     PCOS (polycystic ovarian syndrome)        Past Surgical History:   Procedure Laterality Date    ADENOIDECTOMY      CYST REMOVAL      hand    TONSILLECTOMY         Family History   Problem Relation Age of Onset    No Known Problems Mother     No Known Problems Father          Medications have been verified.        Objective   /72   Pulse 72   Temp 98 °F (36.7 °C)   Resp 18   Ht 5' 6.5\" (1.689 m)   Wt 124 kg (274 lb 3.2 oz)   LMP 07/27/2024   SpO2 98%   BMI 43.59 kg/m²   Patient's last menstrual period was 07/27/2024.       Physical Exam     Physical Exam  Vitals and nursing note reviewed.   Constitutional:       Appearance: Normal appearance. She is well-developed.   HENT:      Head: Normocephalic and atraumatic.      Right Ear: Tympanic membrane, ear canal and external ear normal.      Left Ear: Tympanic membrane, ear canal and external ear normal.      Nose: Nose normal.      Mouth/Throat:      Pharynx: Uvula midline. No pharyngeal swelling or posterior oropharyngeal erythema.      Comments: History of tonsillectomy  Eyes:      General: Lids are normal.      Conjunctiva/sclera: Conjunctivae normal.      Pupils: Pupils are equal, round, and reactive to light.   Cardiovascular:      Rate and Rhythm: Normal rate and regular rhythm.      Pulses: Normal pulses.      Heart sounds: Normal heart sounds. No murmur heard.     No " friction rub. No gallop.   Pulmonary:      Effort: Pulmonary effort is normal.      Breath sounds: Normal breath sounds. No wheezing, rhonchi or rales.   Abdominal:      General: Bowel sounds are normal.      Palpations: Abdomen is soft.      Tenderness: There is no abdominal tenderness. There is no right CVA tenderness or left CVA tenderness.   Musculoskeletal:         General: Normal range of motion.      Cervical back: Neck supple.   Lymphadenopathy:      Cervical: No cervical adenopathy.   Skin:     General: Skin is warm and dry.      Capillary Refill: Capillary refill takes less than 2 seconds.      Findings: No rash.   Neurological:      Mental Status: She is alert.       POC rapid strep: negative    POC urine pregnancy: negative    POC urine dip:    Component 8/1/24 1806   LEUKOCYTE ESTERASE,UA negative   NITRITE,UA negative   SL AMB POCT UROBILINOGEN normal (0.2)   POCT URINE PROTEIN trace    PH,UA 7.5   BLOOD,UA trace (non-hemolyzed)   SPECIFIC GRAVITY,UA 1.010   KETONES,UA negative   BILIRUBIN,UA negative   GLUCOSE, UA negative    COLOR,UA yellow   CLARITY,UA clear

## 2024-08-02 LAB
C TRACH DNA SPEC QL NAA+PROBE: NEGATIVE
N GONORRHOEA DNA SPEC QL NAA+PROBE: NEGATIVE

## 2024-11-14 ENCOUNTER — APPOINTMENT (OUTPATIENT)
Dept: RADIOLOGY | Facility: CLINIC | Age: 29
End: 2024-11-14
Payer: COMMERCIAL

## 2024-11-14 ENCOUNTER — OFFICE VISIT (OUTPATIENT)
Dept: URGENT CARE | Facility: CLINIC | Age: 29
End: 2024-11-14
Payer: COMMERCIAL

## 2024-11-14 VITALS
HEART RATE: 100 BPM | DIASTOLIC BLOOD PRESSURE: 82 MMHG | TEMPERATURE: 98.7 F | RESPIRATION RATE: 20 BRPM | BODY MASS INDEX: 42.85 KG/M2 | SYSTOLIC BLOOD PRESSURE: 129 MMHG | HEIGHT: 67 IN | OXYGEN SATURATION: 98 % | WEIGHT: 273 LBS

## 2024-11-14 DIAGNOSIS — J40 SINOBRONCHITIS: ICD-10-CM

## 2024-11-14 DIAGNOSIS — J32.9 SINOBRONCHITIS: Primary | ICD-10-CM

## 2024-11-14 DIAGNOSIS — T36.95XA ANTIBIOTIC-INDUCED YEAST INFECTION: ICD-10-CM

## 2024-11-14 DIAGNOSIS — J40 SINOBRONCHITIS: Primary | ICD-10-CM

## 2024-11-14 DIAGNOSIS — B37.9 ANTIBIOTIC-INDUCED YEAST INFECTION: ICD-10-CM

## 2024-11-14 DIAGNOSIS — J32.9 SINOBRONCHITIS: ICD-10-CM

## 2024-11-14 PROCEDURE — 71046 X-RAY EXAM CHEST 2 VIEWS: CPT

## 2024-11-14 PROCEDURE — 99213 OFFICE O/P EST LOW 20 MIN: CPT

## 2024-11-14 PROCEDURE — S9083 URGENT CARE CENTER GLOBAL: HCPCS

## 2024-11-14 RX ORDER — FLUCONAZOLE 150 MG/1
150 TABLET ORAL ONCE
Qty: 1 TABLET | Refills: 0 | Status: SHIPPED | OUTPATIENT
Start: 2024-11-14 | End: 2024-11-14

## 2024-11-14 RX ORDER — PREDNISONE 20 MG/1
40 TABLET ORAL DAILY
Qty: 10 TABLET | Refills: 0 | Status: SHIPPED | OUTPATIENT
Start: 2024-11-14 | End: 2024-11-19

## 2024-11-14 RX ORDER — LISDEXAMFETAMINE DIMESYLATE 30 MG/1
30 CAPSULE ORAL
COMMUNITY
Start: 2024-10-14

## 2024-11-14 RX ORDER — ALBUTEROL SULFATE 90 UG/1
2 INHALANT RESPIRATORY (INHALATION) EVERY 6 HOURS PRN
Qty: 8.5 G | Refills: 0 | Status: SHIPPED | OUTPATIENT
Start: 2024-11-14

## 2024-11-14 RX ORDER — FLUCONAZOLE 150 MG/1
150 TABLET ORAL ONCE
Qty: 2 TABLET | Refills: 0 | Status: SHIPPED | OUTPATIENT
Start: 2024-11-14 | End: 2024-11-14

## 2024-11-15 NOTE — PATIENT INSTRUCTIONS
"Preliminary chest XR read negative, final read pending  Take antibiotic as prescribed  Take oral steroids as prescribed  Use inhaler as needed  Continue with supportive measures, OTC Tylenol/Ibuprofen, nasal decongestants, and cough suppressants   Cool mist humidifiers, throat lozenges, increased fluid intake and rest   Follow up with PCP in 3-5 days  Present to ER if symptoms worsen       If [facility] performed at [facility], our office will contact you with results if changes need to be made to the care plan discussed with you at the visit.  You can review your full results on [facility].      Patient Education     Acute bronchitis in adults   The Basics   Written by the doctors and editors at [facility]   What is bronchitis? -- This is an infection that causes a cough. It happens when the tubes that carry air into the lungs, called the \"bronchi,\" get infected (figure 1).  Usually, bronchitis happens after a person gets a cold or the flu. The viruses that cause the cold or flu infect the bronchi and irritate them. Antibiotics do not help bronchitis.  Bronchitis can also happen when a person gets an infection called \"whooping cough,\" but this is much less common. Whooping cough is caused by bacteria that can infect the bronchi. Most people get vaccines to prevent whooping cough, but the vaccine doesn't always work. Your doctor will be able to tell if you have whooping cough by doing an exam and listening to your cough.  This article is about \"acute\" bronchitis. This is different from \"chronic\" bronchitis, which is a lung disease that most often affects people who smoke.  What are the symptoms of bronchitis? -- The most common symptoms are:   A cough that can last up to a few weeks   Coughing up mucus that is clear, yellow, or green - Green mucus does not always mean that you have a bacterial infection.  You might also have other cold or flu symptoms, like a stuffy nose, sore throat, or headache. People " with bronchitis do not usually get a fever.  Will I need tests? -- Most people with bronchitis do not need a test. But if your doctor or nurse is not sure what is causing your cough, they might do tests. For example, they might order a chest X-ray. Or if they think that you might have COVID-19, they will test you for the virus that causes the infection.  How is bronchitis treated? -- Bronchitis almost always goes away on its own. But the cough can take up to 3 weeks to get better, and sometimes even longer.  Doctors do not usually treat bronchitis with antibiotic medicines. That's because bronchitis is usually caused by a virus, and antibiotics kill bacteria, not viruses. Also, antibiotics can actually cause other problems.  To feel better, you can treat your cold and flu symptoms. You can:   Get lots of rest, and drink plenty of liquids.   Drink hot tea.   Suck on cough drops or hard candy.   Take over-the-counter cough and cold medicines.   Breath in warm, moist air, such as in the shower, over a kettle, or from a humidifier.   Take a pain-relieving medicine if you have cold or flu symptoms like headache, muscle aches, or joint pain.  Avoid smoking or being around others who smoke. This can make your cough worse.  How can I keep from getting bronchitis again? -- You can reduce your chance of getting bronchitis again by keeping the germs that cause bronchitis out of your body. One of the best ways to do this is to wash your hands often with soap and water. If there is no sink nearby, you can use a hand gel with alcohol in it to clean your hands.  How can I keep from spreading germs? -- In addition to washing your hands often, cover your mouth with your elbow when you sneeze or cough. Using your elbow keeps you from getting germs on your hands. If you use a tissue, throw the tissue away and wash your hands.  When should I call the doctor? -- Call for advice if you have:   A fever higher than 100.4°F (38°C), or  "chills   Chest pain when you cough, trouble breathing, or coughing up blood   A barking cough that makes it hard to talk   Cough and weight loss that you cannot explain   Symptoms that are not getting better after 3 weeks  All topics are updated as new evidence becomes available and our peer review process is complete.  This topic retrieved from [employer] on: May 16, 2024.  Topic 53231 Version 17.0  Release: 32.4.3 - C32.135  © 2024 UpToDate, Inc. and/or its affiliates. All rights reserved.  figure 1: Normal lungs     The lungs sit in the chest, inside the ribcage. They are covered with a thin membrane called the \"pleura.\" The windpipe, or trachea, branches into 2 smaller airways called the left and right \"bronchi.\" The space between the lungs is called the \"mediastinum.\" Lymph nodes are located within and around the lungs and mediastinum.  Graphic 25708 Version 14.0  Consumer Information Use and Disclaimer   Disclaimer: This generalized information is a limited summary of diagnosis, treatment, and/or medication information. It is not meant to be comprehensive and should be used as a tool to help the user understand and/or assess potential diagnostic and treatment options. It does NOT include all information about conditions, treatments, medications, side effects, or risks that may apply to a specific patient. It is not intended to be medical advice or a substitute for the medical advice, diagnosis, or treatment of a health care provider based on the health care provider's examination and assessment of a patient's specific and unique circumstances. Patients must speak with a health care provider for complete information about their health, medical questions, and treatment options, including any risks or benefits regarding use of medications. This information does not endorse any treatments or medications as safe, effective, or approved for treating a specific patient. UpToDate, Inc. and its affiliates disclaim any " warranty or liability relating to this information or the use thereof.The use of this information is governed by the Terms of Use, available at https://www.wolterskluwer.com/en/know/clinical-effectiveness-terms. 2024© [employer] and its affiliates and/or licensors. All rights reserved.  Copyright   © 2024 [employer] and/or its affiliates. All rights reserved.

## 2024-11-15 NOTE — PROGRESS NOTES
"  [facility]        NAME: Courtney Rocha is a 29 y.o. female  : 1995    MRN: [id number]  DATE: 2024  TIME: 7:42 PM    Assessment and Plan   Sinobronchitis [J32.9, J40]  1. Sinobronchitis  XR chest pa and lateral    albuterol (ProAir HFA) 90 mcg/act inhaler    predniSONE 20 mg tablet    amoxicillin-clavulanate (AUGMENTIN) 875-125 mg per tablet      2. Antibiotic-induced yeast infection  fluconazole (DIFLUCAN) 150 mg tablet        Lungs clear on PE and VSS. Preliminary chest XR read negative, final read pending. Given symptom duration x3-4 weeks, will treat with Augmentin, oral steroids, and albuterol inhaler PRN. Requesting Diflucan as prone to antibiotic-induced yeast infections. Encouraged continued supportive measures.  Follow up with PCP in 3-5 days or proceed to emergency department for worsening symptoms.  Patient verbalized understanding of instructions given.       Patient Instructions     Patient Instructions   Preliminary chest XR read negative, final read pending  Take antibiotic as prescribed  Take oral steroids as prescribed  Use inhaler as needed  Continue with supportive measures, OTC Tylenol/Ibuprofen, nasal decongestants, and cough suppressants   Cool mist humidifiers, throat lozenges, increased fluid intake and rest   Follow up with PCP in 3-5 days  Present to ER if symptoms worsen       If tests have been performed at [facility], our office will contact you with results if changes need to be made to the care plan discussed with you at the visit.  You can review your full results on [facility] MyChart.      Patient Education     Acute bronchitis in adults   The Basics   Written by the doctors and editors at [facility]   What is bronchitis? -- This is an infection that causes a cough. It happens when the tubes that carry air into the lungs, called the \"bronchi,\" get infected (figure 1).  Usually, bronchitis happens after a person gets a cold or the flu. The viruses that cause " "the cold or flu infect the bronchi and irritate them. Antibiotics do not help bronchitis.  Bronchitis can also happen when a person gets an infection called \"whooping cough,\" but this is much less common. Whooping cough is caused by bacteria that can infect the bronchi. Most people get vaccines to prevent whooping cough, but the vaccine doesn't always work. Your doctor will be able to tell if you have whooping cough by doing an exam and listening to your cough.  This article is about \"acute\" bronchitis. This is different from \"chronic\" bronchitis, which is a lung disease that most often affects people who smoke.  What are the symptoms of bronchitis? -- The most common symptoms are:   A cough that can last up to a few weeks   Coughing up mucus that is clear, yellow, or green - Green mucus does not always mean that you have a bacterial infection.  You might also have other cold or flu symptoms, like a stuffy nose, sore throat, or headache. People with bronchitis do not usually get a fever.  Will I need tests? -- Most people with bronchitis do not need a test. But if your doctor or nurse is not sure what is causing your cough, they might do tests. For example, they might order a chest X-ray. Or if they think that you might have COVID-19, they will test you for the virus that causes the infection.  How is bronchitis treated? -- Bronchitis almost always goes away on its own. But the cough can take up to 3 weeks to get better, and sometimes even longer.  Doctors do not usually treat bronchitis with antibiotic medicines. That's because bronchitis is usually caused by a virus, and antibiotics kill bacteria, not viruses. Also, antibiotics can actually cause other problems.  To feel better, you can treat your cold and flu symptoms. You can:   Get lots of rest, and drink plenty of liquids.   Drink hot tea.   Suck on cough drops or hard candy.   Take over-the-counter cough and cold medicines.   Breath in warm, moist air, such " "as in the shower, over a kettle, or from a humidifier.   Take a pain-relieving medicine if you have cold or flu symptoms like headache, muscle aches, or joint pain.  Avoid smoking or being around others who smoke. This can make your cough worse.  How can I keep from getting bronchitis again? -- You can reduce your chance of getting bronchitis again by keeping the germs that cause bronchitis out of your body. One of the best ways to do this is to wash your hands often with soap and water. If there is no sink nearby, you can use a hand gel with alcohol in it to clean your hands.  How can I keep from spreading germs? -- In addition to washing your hands often, cover your mouth with your elbow when you sneeze or cough. Using your elbow keeps you from getting germs on your hands. If you use a tissue, throw the tissue away and wash your hands.  When should I call the doctor? -- Call for advice if you have:   A fever higher than 100.4°F (38°C), or chills   Chest pain when you cough, trouble breathing, or coughing up blood   A barking cough that makes it hard to talk   Cough and weight loss that you cannot explain   Symptoms that are not getting better after 3 weeks  All topics are updated as new evidence becomes available and our peer review process is complete.  This topic retrieved from [employer] on: May 16, 2024.  Topic 02126 Version 17.0  Release: 32.4.3 - C32.135  © 2024 UpToDate, Inc. and/or its affiliates. All rights reserved.  figure 1: Normal lungs     The lungs sit in the chest, inside the ribcage. They are covered with a thin membrane called the \"pleura.\" The windpipe, or trachea, branches into 2 smaller airways called the left and right \"bronchi.\" The space between the lungs is called the \"mediastinum.\" Lymph nodes are located within and around the lungs and mediastinum.  Graphic 74830 Version 14.0  Consumer Information Use and Disclaimer   Disclaimer: This generalized information is a limited summary of " diagnosis, treatment, and/or medication information. It is not meant to be comprehensive and should be used as a tool to help the user understand and/or assess potential diagnostic and treatment options. It does NOT include all information about conditions, treatments, medications, side effects, or risks that may apply to a specific patient. It is not intended to be medical advice or a substitute for the medical advice, diagnosis, or treatment of a health care provider based on the health care provider's examination and assessment of a patient's specific and unique circumstances. Patients must speak with a health care provider for complete information about their health, medical questions, and treatment options, including any risks or benefits regarding use of medications. This information does not endorse any treatments or medications as safe, effective, or approved for treating a specific patient. UpToDate, Inc. and its affiliates disclaim any warranty or liability relating to this information or the use thereof.The use of this information is governed by the Terms of Use, available at https://www.[employer].com/en/know/clinical-effectiveness-terms. 2024© UpToDate, Inc. and its affiliates and/or licensors. All rights reserved.  Copyright   © 2024 UpToDate, Inc. and/or its affiliates. All rights reserved.        Chief Complaint     Chief Complaint   Patient presents with    Cough         History of Present Illness       29-year-old female with no significant past medical history presents with complaints of ongoing nasal congestion, sinus pressure, sore throat, and cough x 3 to 4 weeks.  Reports cough is productive and accompanied by shortness of breath as well as chest tightness.  No fever, chills, vomiting, or diarrhea.  Completed course of azithromycin about 3 weeks ago however symptoms persist.  Denies recent known sick contacts or exposures.    Cough  Associated symptoms include rhinorrhea, a sore throat and  shortness of breath. Pertinent negatives include no chest pain, chills, ear pain, fever, rash or wheezing.       Review of Systems   Review of Systems   Constitutional:  Negative for chills and fever.   HENT:  Positive for congestion, rhinorrhea, sinus pressure, sinus pain and sore throat. Negative for ear discharge, ear pain, trouble swallowing and voice change.    Eyes:  Negative for discharge.   Respiratory:  Positive for cough, chest tightness and shortness of breath. Negative for wheezing.    Cardiovascular:  Negative for chest pain.   Gastrointestinal:  Negative for abdominal pain, diarrhea, nausea and vomiting.   Skin:  Negative for rash.         Current Medications       Current Outpatient Medications:     albuterol (ProAir HFA) 90 mcg/act inhaler, Inhale 2 puffs every 6 (six) hours as needed for wheezing or shortness of breath, Disp: 8.5 g, Rfl: 0    amoxicillin-clavulanate (AUGMENTIN) 875-125 mg per tablet, Take 1 tablet by mouth every 12 (twelve) hours for 7 days, Disp: 14 tablet, Rfl: 0    fluconazole (DIFLUCAN) 150 mg tablet, Take 1 tablet (150 mg total) by mouth once for 1 dose, Disp: 1 tablet, Rfl: 0    lisdexamfetamine (VYVANSE) 30 MG capsule, Take 30 mg by mouth, Disp: , Rfl:     metFORMIN (GLUCOPHAGE) 500 mg tablet, Take 500 mg by mouth, Disp: , Rfl:     predniSONE 20 mg tablet, Take 2 tablets (40 mg total) by mouth daily for 5 days, Disp: 10 tablet, Rfl: 0    valACYclovir (VALTREX) 1,000 mg tablet, , Disp: , Rfl:     Semaglutide-Weight Management (Wegovy) 2.4 MG/0.75ML, Inject 2.4 mg under the skin Once a week (Patient not taking: Reported on 11/14/2024), Disp: , Rfl:     Current Allergies     Allergies as of 11/14/2024    (No Known Allergies)            The following portions of the patient's history were reviewed and updated as appropriate: allergies, current medications, past family history, past medical history, past social history, past surgical history and problem list.     Past Medical  "History:   Diagnosis Date    Depression     PCOS (polycystic ovarian syndrome)        Past Surgical History:   Procedure Laterality Date    ADENOIDECTOMY      CYST REMOVAL      hand    TONSILLECTOMY         Family History   Problem Relation Age of Onset    No Known Problems Mother     No Known Problems Father          Medications have been verified.        Objective   /82   Pulse 100   Temp 98.7 °F (37.1 °C)   Resp 20   Ht 5' 6.5\" (1.689 m)   Wt 124 kg (273 lb)   LMP 11/01/2024   SpO2 98%   BMI 43.40 kg/m²   Patient's last menstrual period was 11/01/2024.       Physical Exam     Physical Exam  Vitals and nursing note reviewed.   Constitutional:       General: She is not in acute distress.     Appearance: She is not toxic-appearing.   HENT:      Head: Normocephalic.      Right Ear: Tympanic membrane, ear canal and external ear normal.      Left Ear: Tympanic membrane, ear canal and external ear normal.      Nose: Congestion present.      Right Sinus: Maxillary sinus tenderness present. No frontal sinus tenderness.      Left Sinus: Maxillary sinus tenderness present. No frontal sinus tenderness.      Mouth/Throat:      Mouth: Mucous membranes are moist.      Pharynx: Posterior oropharyngeal erythema present.   Eyes:      Conjunctiva/sclera: Conjunctivae normal.   Cardiovascular:      Rate and Rhythm: Normal rate and regular rhythm.      Heart sounds: Normal heart sounds.   Pulmonary:      Effort: Pulmonary effort is normal. No respiratory distress.      Breath sounds: Normal breath sounds. No stridor. No wheezing, rhonchi or rales.   Lymphadenopathy:      Cervical: No cervical adenopathy.   Skin:     General: Skin is warm and dry.   Neurological:      Mental Status: She is alert and oriented to person, place, and time.      Gait: Gait is intact.   Psychiatric:         Mood and Affect: Mood normal.         Behavior: Behavior normal.                   "

## 2025-01-02 ENCOUNTER — OFFICE VISIT (OUTPATIENT)
Dept: URGENT CARE | Facility: CLINIC | Age: 30
End: 2025-01-02
Payer: COMMERCIAL

## 2025-01-02 VITALS
WEIGHT: 281.6 LBS | HEART RATE: 65 BPM | SYSTOLIC BLOOD PRESSURE: 119 MMHG | RESPIRATION RATE: 18 BRPM | OXYGEN SATURATION: 100 % | BODY MASS INDEX: 45.26 KG/M2 | TEMPERATURE: 97.3 F | HEIGHT: 66 IN | DIASTOLIC BLOOD PRESSURE: 71 MMHG

## 2025-01-02 DIAGNOSIS — H65.03 NON-RECURRENT ACUTE SEROUS OTITIS MEDIA OF BOTH EARS: ICD-10-CM

## 2025-01-02 DIAGNOSIS — T36.95XA ANTIBIOTIC-INDUCED YEAST INFECTION: ICD-10-CM

## 2025-01-02 DIAGNOSIS — J06.9 UPPER RESPIRATORY TRACT INFECTION, UNSPECIFIED TYPE: Primary | ICD-10-CM

## 2025-01-02 DIAGNOSIS — B37.9 ANTIBIOTIC-INDUCED YEAST INFECTION: ICD-10-CM

## 2025-01-02 PROCEDURE — S9083 URGENT CARE CENTER GLOBAL: HCPCS

## 2025-01-02 PROCEDURE — 99213 OFFICE O/P EST LOW 20 MIN: CPT

## 2025-01-02 RX ORDER — AZITHROMYCIN 250 MG/1
TABLET, FILM COATED ORAL
Qty: 6 TABLET | Refills: 0 | Status: SHIPPED | OUTPATIENT
Start: 2025-01-02 | End: 2025-01-06

## 2025-01-02 RX ORDER — PREDNISONE 20 MG/1
40 TABLET ORAL DAILY
Qty: 10 TABLET | Refills: 0 | Status: SHIPPED | OUTPATIENT
Start: 2025-01-02 | End: 2025-01-07

## 2025-01-02 RX ORDER — FLUCONAZOLE 150 MG/1
150 TABLET ORAL DAILY
Qty: 2 TABLET | Refills: 0 | Status: SHIPPED | OUTPATIENT
Start: 2025-01-02 | End: 2025-01-04

## 2025-01-02 NOTE — PROGRESS NOTES
[facility]        NAME: Courtney Rocha is a 29 y.o. female  : 1995    MRN: [id number]  DATE: 2025  TIME: 5:16 PM    Assessment and Plan   Upper respiratory tract infection, unspecified type [J06.9]  1. Upper respiratory tract infection, unspecified type  predniSONE 20 mg tablet    azithromycin (ZITHROMAX) 250 mg tablet      2. Antibiotic-induced yeast infection  fluconazole (DIFLUCAN) 150 mg tablet      3. Non-recurrent acute serous otitis media of both ears  predniSONE 20 mg tablet    azithromycin (ZITHROMAX) 250 mg tablet            Patient Instructions       Take full course of Azithromycin as prescribed  Eat yogurt with live and active cultures and/or take a probiotic at least 3 hours before or after antibiotic dose. Monitor stool for diarrhea and/or blood. If this occurs, contact primary care doctor ASAP.   Take prednisone as prescribed - take in morning with food    Take diflucan towards end of antibiotic treatment.     Take over the counter Mucinex during the day  Take over the counter cough suppressant at night  Fluids and rest (Warm water with honey and lemon)  Tylenol/Ibuprofen for pain fever    Follow up with PCP in 3-5 days.  Proceed to  ER if symptoms worsen.    If tests are performed, our office will contact you with results only if changes need to made to the care plan discussed with you at the visit. You can review your full results on [facility].    Chief Complaint     Chief Complaint   Patient presents with    Cold Like Symptoms     Fatigue, congestion, chills and cough started 2 weeks ago and has been getting worse          History of Present Illness       29-year-old female arrives reporting 2 weeks of ongoing cough and congestion with generalized fatigue and chills.  Patient reports multiple family members at home are sick with similar symptoms.  Patient reports a feeling of fullness and water moving around in both ears.  Patient denies any recent fevers but  does report she has been continually having chills.  Patient reports she has been taking Tylenol and Motrin but has not had much relief.  Patient denies any current shortness of breath, chest pain or abdominal pain.        Review of Systems   Review of Systems   Constitutional:  Positive for chills, fatigue and fever. Negative for diaphoresis.   HENT:  Positive for congestion and ear pain. Negative for sinus pressure, sinus pain and sore throat.    Respiratory:  Positive for cough. Negative for shortness of breath.    Cardiovascular: Negative.    Gastrointestinal: Negative.          Current Medications       Current Outpatient Medications:     azithromycin (ZITHROMAX) 250 mg tablet, Take 2 tablets today then 1 tablet daily x 4 days, Disp: 6 tablet, Rfl: 0    fluconazole (DIFLUCAN) 150 mg tablet, Take 1 tablet (150 mg total) by mouth daily for 2 days, Disp: 2 tablet, Rfl: 0    lisdexamfetamine (VYVANSE) 30 MG capsule, Take 30 mg by mouth, Disp: , Rfl:     predniSONE 20 mg tablet, Take 2 tablets (40 mg total) by mouth daily for 5 days, Disp: 10 tablet, Rfl: 0    valACYclovir (VALTREX) 1,000 mg tablet, , Disp: , Rfl:     albuterol (ProAir HFA) 90 mcg/act inhaler, Inhale 2 puffs every 6 (six) hours as needed for wheezing or shortness of breath (Patient not taking: Reported on 1/2/2025), Disp: 8.5 g, Rfl: 0    metFORMIN (GLUCOPHAGE) 500 mg tablet, Take 500 mg by mouth (Patient not taking: Reported on 1/2/2025), Disp: , Rfl:     Semaglutide-Weight Management (Wegovy) 2.4 MG/0.75ML, Inject 2.4 mg under the skin Once a week (Patient not taking: Reported on 1/2/2025), Disp: , Rfl:     Current Allergies     Allergies as of 01/02/2025    (No Known Allergies)            The following portions of the patient's history were reviewed and updated as appropriate: allergies, current medications, past family history, past medical history, past social history, past surgical history and problem list.     Past Medical History:  "  Diagnosis Date    Depression     PCOS (polycystic ovarian syndrome)        Past Surgical History:   Procedure Laterality Date    ADENOIDECTOMY      CYST REMOVAL      hand    TONSILLECTOMY         Family History   Problem Relation Age of Onset    No Known Problems Mother     No Known Problems Father          Medications have been verified.        Objective   /71   Pulse 65   Temp (!) 97.3 °F (36.3 °C)   Resp 18   Ht 5' 6\" (1.676 m)   Wt 128 kg (281 lb 9.6 oz)   LMP 11/03/2024   SpO2 100%   BMI 45.45 kg/m²        Physical Exam     Physical Exam  Vitals and nursing note reviewed.   Constitutional:       General: She is not in acute distress.     Appearance: Normal appearance. She is ill-appearing.   HENT:      Head: Normocephalic.      Right Ear: External ear normal. A middle ear effusion is present. Tympanic membrane is bulging.      Left Ear: External ear normal. A middle ear effusion is present. Tympanic membrane is bulging.      Nose: Congestion present.      Mouth/Throat:      Mouth: Mucous membranes are moist.      Pharynx: No oropharyngeal exudate or posterior oropharyngeal erythema.   Eyes:      Pupils: Pupils are equal, round, and reactive to light.   Cardiovascular:      Rate and Rhythm: Normal rate and regular rhythm.      Pulses: Normal pulses.      Heart sounds: Normal heart sounds.   Pulmonary:      Effort: Pulmonary effort is normal. No respiratory distress.      Breath sounds: Normal breath sounds. No stridor. No wheezing, rhonchi or rales.   Chest:      Chest wall: No tenderness.   Musculoskeletal:         General: Normal range of motion.      Cervical back: Normal range of motion and neck supple.   Lymphadenopathy:      Cervical: No cervical adenopathy.   Skin:     General: Skin is warm and dry.      Capillary Refill: Capillary refill takes less than 2 seconds.   Neurological:      General: No focal deficit present.      Mental Status: She is alert and oriented to person, place, and " time.   Psychiatric:         Mood and Affect: Mood normal.         Behavior: Behavior normal.

## 2025-01-02 NOTE — PATIENT INSTRUCTIONS
Take full course of Azithromycin as prescribed  Eat yogurt with live and active cultures and/or take a probiotic at least 3 hours before or after antibiotic dose. Monitor stool for diarrhea and/or blood. If this occurs, contact primary care doctor ASAP.   Take prednisone as prescribed - take in morning with food    Take diflucan towards end of antibiotic treatment.     Take over the counter Mucinex during the day  Take over the counter cough suppressant at night  Fluids and rest (Warm water with honey and lemon)  Tylenol/Ibuprofen for pain fever    Follow up with PCP in 3-5 days.  Proceed to  ER if symptoms worsen.    If tests are performed, our office will contact you with results only if changes need to made to the care plan discussed with you at the visit. You can review your full results on [facility] Mychart.

## 2025-01-20 ENCOUNTER — OFFICE VISIT (OUTPATIENT)
Dept: URGENT CARE | Facility: CLINIC | Age: 30
End: 2025-01-20
Payer: COMMERCIAL

## 2025-01-20 VITALS
HEART RATE: 78 BPM | SYSTOLIC BLOOD PRESSURE: 130 MMHG | BODY MASS INDEX: 45.29 KG/M2 | HEIGHT: 66 IN | DIASTOLIC BLOOD PRESSURE: 64 MMHG | RESPIRATION RATE: 18 BRPM | WEIGHT: 281.8 LBS | TEMPERATURE: 100.4 F | OXYGEN SATURATION: 98 %

## 2025-01-20 DIAGNOSIS — T36.95XA ANTIBIOTIC-INDUCED YEAST INFECTION: ICD-10-CM

## 2025-01-20 DIAGNOSIS — B37.9 ANTIBIOTIC-INDUCED YEAST INFECTION: ICD-10-CM

## 2025-01-20 DIAGNOSIS — J06.9 UPPER RESPIRATORY TRACT INFECTION, UNSPECIFIED TYPE: Primary | ICD-10-CM

## 2025-01-20 PROCEDURE — 99213 OFFICE O/P EST LOW 20 MIN: CPT

## 2025-01-20 PROCEDURE — S9083 URGENT CARE CENTER GLOBAL: HCPCS

## 2025-01-20 RX ORDER — ALBUTEROL SULFATE 90 UG/1
2 INHALANT RESPIRATORY (INHALATION) EVERY 6 HOURS PRN
Qty: 8.5 G | Refills: 0 | Status: SHIPPED | OUTPATIENT
Start: 2025-01-20

## 2025-01-20 RX ORDER — METHYLPREDNISOLONE 4 MG/1
TABLET ORAL
Qty: 1 EACH | Refills: 0 | Status: SHIPPED | OUTPATIENT
Start: 2025-01-20

## 2025-01-20 RX ORDER — FLUCONAZOLE 150 MG/1
150 TABLET ORAL DAILY
Qty: 2 TABLET | Refills: 0 | Status: SHIPPED | OUTPATIENT
Start: 2025-01-20 | End: 2025-01-22

## 2025-01-20 RX ORDER — FLUCONAZOLE 150 MG/1
150 TABLET ORAL ONCE
Qty: 1 TABLET | Refills: 0 | Status: SHIPPED | OUTPATIENT
Start: 2025-01-20 | End: 2025-01-20

## 2025-01-20 RX ORDER — BENZONATATE 100 MG/1
100 CAPSULE ORAL 3 TIMES DAILY PRN
Qty: 20 CAPSULE | Refills: 0 | Status: SHIPPED | OUTPATIENT
Start: 2025-01-20

## 2025-01-20 NOTE — PATIENT INSTRUCTIONS
Cold symptoms may last for a total of 1-2 weeks. Symptoms typically start with a sore throat and progress to include sinus pain/pressure, runny nose (yellow/green), and congestion/cough. The yellow/green color does not necessarily indicate a bacterial sinus infection. If your sinus symptoms worsen on day 10-14, you may want to consider re-evaluation for a possible secondary bacterial sinus infection. Coughs are typically most bothersome the first 1-2 weeks. Coughs frequently linger for 4-6 weeks. However, have your lungs re-evaluated if you develop sudden worsening cough, shortness or breath or chest discomfort.     Medication as prescribed    Vitamin D3 2000 IU daily  Vitamin C 1000mg twice per day  Some studies suggest that Zinc 12.5-15mg every 2 hours while awake x 5 days may shorten the duration cold symptoms by 1-2 days.   Fluids and rest  Nasal saline spray (Extra Strength) 3 drops in each nostril 4x per day may shorten the duration of illness by 1-2 days and help prevent spread.  Afrin if severe congestion (do not use for more than 3 days)  Over the counter cold medication as needed (EX: Mucinex DM, Sudafed I.e. pseudoephedrine, tylenol)  Sinus Rinses (used distilled water only and carefully follow instructions)  Salt water gargles and chloraseptic spray  Throat Coat Tea (herbal licorice root tea for sore throat-add honey unless diabetic)  Warm compresses over sinuses  Steam treatment (utilize proper safety precautions when in contact with hot water/steam)

## 2025-01-20 NOTE — PROGRESS NOTES
[facility]        NAME: Courtney Rocha is a 29 y.o. female  : 1995    MRN: [id number]  DATE: 2025  TIME: 11:17 AM    Assessment and Plan   Upper respiratory tract infection, unspecified type [J06.9]  1. Upper respiratory tract infection, unspecified type  albuterol (ProAir HFA) 90 mcg/act inhaler    methylPREDNISolone 4 MG tablet therapy pack    benzonatate (TESSALON PERLES) 100 mg capsule    fluconazole (DIFLUCAN) 150 mg tablet    DISCONTINUED: fluconazole (DIFLUCAN) 150 mg tablet      2. Antibiotic-induced yeast infection          Suspected upper respiratory infection to be viral and explained this to patient.  Patient expressed understanding antibiotics are not needed for viral infections.  Patient understands that if sinus pressure/pain or continue worsen between the 10 to 12-day odette that they would require antibiotics and will seek further evaluation at that time.  Patient reports getting yeast infections frequently whenever taking steroids or antibiotics.  Prescribed 2 time dose of Diflucan given sometimes failing single dose therapy.  Patient Instructions     Patient Instructions   Cold symptoms may last for a total of 1-2 weeks. Symptoms typically start with a sore throat and progress to include sinus pain/pressure, runny nose (yellow/green), and congestion/cough. The yellow/green color does not necessarily indicate a bacterial sinus infection. If your sinus symptoms worsen on day 10-14, you may want to consider re-evaluation for a possible secondary bacterial sinus infection. Coughs are typically most bothersome the first 1-2 weeks. Coughs frequently linger for 4-6 weeks. However, have your lungs re-evaluated if you develop sudden worsening cough, shortness or breath or chest discomfort.     Medication as prescribed    Vitamin D3 2000 IU daily  Vitamin C 1000mg twice per day  Some studies suggest that Zinc 12.5-15mg every 2 hours while awake x 5 days may shorten the duration  cold symptoms by 1-2 days.   Fluids and rest  Nasal saline spray (Extra Strength) 3 drops in each nostril 4x per day may shorten the duration of illness by 1-2 days and help prevent spread.  Afrin if severe congestion (do not use for more than 3 days)  Over the counter cold medication as needed (EX: Mucinex DM, Sudafed I.e. pseudoephedrine, tylenol)  Sinus Rinses (used distilled water only and carefully follow instructions)  Salt water gargles and chloraseptic spray  Throat Coat Tea (herbal licorice root tea for sore throat-add honey unless diabetic)  Warm compresses over sinuses  Steam treatment (utilize proper safety precautions when in contact with hot water/steam)        Follow up with PCP in 3-5 days.  Proceed to  ER if symptoms worsen.    Chief Complaint     Chief Complaint   Patient presents with    Cold Like Symptoms     Sore throat ear pain, chest congestion and runny nose started a week ago          History of Present Illness       29-year-old female past medical history of asthma presents with cold-like symptoms x 6 days.  Patient reports initially having minor cough and congestion however symptoms have slowly progressed to worsening sinus pressure, headaches, chills, wheezing and productive cough.  Patient reports having sick contact daughter.  Patient reports using her mom's albuterol inhaler and over-the-counter DayQuil and NyQuil with minor relief.  Patient states she was wondering if she can get a Z-Ga for her symptoms.        Review of Systems   Review of Systems   Constitutional:  Positive for chills and fatigue. Negative for diaphoresis and fever.   HENT:  Positive for congestion, postnasal drip and sore throat. Negative for ear discharge, ear pain, rhinorrhea and sinus pressure.    Respiratory:  Positive for cough and wheezing. Negative for choking.    Cardiovascular:  Negative for chest pain and palpitations.   Gastrointestinal:  Negative for diarrhea, nausea and vomiting.   Musculoskeletal:   Negative for arthralgias and myalgias.   Skin:  Negative for color change.   Neurological:  Positive for headaches. Negative for dizziness and light-headedness.         Current Medications       Current Outpatient Medications:     albuterol (ProAir HFA) 90 mcg/act inhaler, Inhale 2 puffs every 6 (six) hours as needed for wheezing, Disp: 8.5 g, Rfl: 0    benzonatate (TESSALON PERLES) 100 mg capsule, Take 1 capsule (100 mg total) by mouth 3 (three) times a day as needed for cough, Disp: 20 capsule, Rfl: 0    fluconazole (DIFLUCAN) 150 mg tablet, Take 1 tablet (150 mg total) by mouth daily for 2 doses, Disp: 2 tablet, Rfl: 0    methylPREDNISolone 4 MG tablet therapy pack, Use as directed on package, Disp: 1 each, Rfl: 0    valACYclovir (VALTREX) 1,000 mg tablet, , Disp: , Rfl:     albuterol (ProAir HFA) 90 mcg/act inhaler, Inhale 2 puffs every 6 (six) hours as needed for wheezing or shortness of breath (Patient not taking: Reported on 1/20/2025), Disp: 8.5 g, Rfl: 0    lisdexamfetamine (VYVANSE) 30 MG capsule, Take 30 mg by mouth (Patient not taking: Reported on 1/20/2025), Disp: , Rfl:     metFORMIN (GLUCOPHAGE) 500 mg tablet, Take 500 mg by mouth (Patient not taking: Reported on 1/2/2025), Disp: , Rfl:     Semaglutide-Weight Management (Wegovy) 2.4 MG/0.75ML, Inject 2.4 mg under the skin Once a week (Patient not taking: Reported on 11/14/2024), Disp: , Rfl:     Current Allergies     Allergies as of 01/20/2025    (No Known Allergies)            The following portions of the patient's history were reviewed and updated as appropriate: allergies, current medications, past family history, past medical history, past social history, past surgical history and problem list.     Past Medical History:   Diagnosis Date    Depression     PCOS (polycystic ovarian syndrome)        Past Surgical History:   Procedure Laterality Date    ADENOIDECTOMY      CYST REMOVAL      hand    TONSILLECTOMY         Family History   Problem  "Relation Age of Onset    No Known Problems Mother     No Known Problems Father          Medications have been verified.        Objective   /64   Pulse 78   Temp 100.4 °F (38 °C)   Resp 18   Ht 5' 6\" (1.676 m)   Wt 128 kg (281 lb 12.8 oz)   LMP 01/04/2025   SpO2 98%   BMI 45.48 kg/m²        Physical Exam     Physical Exam  Vitals and nursing note reviewed.   Constitutional:       General: She is not in acute distress.     Appearance: She is normal weight.   HENT:      Head: Normocephalic and atraumatic.      Right Ear: Tympanic membrane, ear canal and external ear normal.      Left Ear: Tympanic membrane, ear canal and external ear normal.      Nose: Congestion present.      Mouth/Throat:      Mouth: Mucous membranes are moist.      Pharynx: Oropharynx is clear. Posterior oropharyngeal erythema present.   Eyes:      General:         Right eye: No discharge.         Left eye: No discharge.      Conjunctiva/sclera: Conjunctivae normal.      Pupils: Pupils are equal, round, and reactive to light.   Cardiovascular:      Rate and Rhythm: Normal rate and regular rhythm.      Pulses: Normal pulses.      Heart sounds: Normal heart sounds.   Pulmonary:      Effort: Pulmonary effort is normal.      Breath sounds: Normal breath sounds. No wheezing or rhonchi.   Abdominal:      General: Bowel sounds are normal.      Palpations: Abdomen is soft.      Tenderness: There is no abdominal tenderness.   Musculoskeletal:      Cervical back: Tenderness present.   Lymphadenopathy:      Cervical: Cervical adenopathy present.   Skin:     General: Skin is warm and dry.      Capillary Refill: Capillary refill takes less than 2 seconds.   Neurological:      General: No focal deficit present.      Mental Status: She is alert and oriented to person, place, and time.   Psychiatric:         Mood and Affect: Mood normal.         Behavior: Behavior normal.                   "